# Patient Record
Sex: MALE | Employment: FULL TIME | URBAN - METROPOLITAN AREA
[De-identification: names, ages, dates, MRNs, and addresses within clinical notes are randomized per-mention and may not be internally consistent; named-entity substitution may affect disease eponyms.]

---

## 2018-04-24 ENCOUNTER — HOSPITAL ENCOUNTER (OUTPATIENT)
Dept: LAB | Facility: MEDICAL CENTER | Age: 28
End: 2018-04-24
Attending: FAMILY MEDICINE
Payer: OTHER GOVERNMENT

## 2018-04-24 ENCOUNTER — OFFICE VISIT (OUTPATIENT)
Dept: MEDICAL GROUP | Facility: PHYSICIAN GROUP | Age: 28
End: 2018-04-24
Payer: OTHER GOVERNMENT

## 2018-04-24 VITALS
HEIGHT: 68 IN | SYSTOLIC BLOOD PRESSURE: 104 MMHG | OXYGEN SATURATION: 97 % | WEIGHT: 185 LBS | TEMPERATURE: 97.7 F | RESPIRATION RATE: 14 BRPM | DIASTOLIC BLOOD PRESSURE: 82 MMHG | HEART RATE: 58 BPM | BODY MASS INDEX: 28.04 KG/M2

## 2018-04-24 DIAGNOSIS — Z20.2 STD EXPOSURE: ICD-10-CM

## 2018-04-24 PROCEDURE — 87491 CHLMYD TRACH DNA AMP PROBE: CPT

## 2018-04-24 PROCEDURE — 87591 N.GONORRHOEAE DNA AMP PROB: CPT

## 2018-04-24 PROCEDURE — 99202 OFFICE O/P NEW SF 15 MIN: CPT | Performed by: FAMILY MEDICINE

## 2018-04-24 ASSESSMENT — PATIENT HEALTH QUESTIONNAIRE - PHQ9: CLINICAL INTERPRETATION OF PHQ2 SCORE: 0

## 2018-04-24 ASSESSMENT — ENCOUNTER SYMPTOMS
NECK PAIN: 0
PSYCHIATRIC NEGATIVE: 1
RESPIRATORY NEGATIVE: 1
NEUROLOGICAL NEGATIVE: 1
EYES NEGATIVE: 1
FEVER: 0
MUSCULOSKELETAL NEGATIVE: 1
HEMOPTYSIS: 0
CONSTIPATION: 0
CHILLS: 0
COUGH: 0
PALPITATIONS: 0
GASTROINTESTINAL NEGATIVE: 1
DIZZINESS: 0
CARDIOVASCULAR NEGATIVE: 1
MYALGIAS: 0
CONSTITUTIONAL NEGATIVE: 1
HEADACHES: 0

## 2018-04-24 NOTE — PROGRESS NOTES
Subjective:      Herman Mckoy is a 27 y.o. male who presents with Sexually Transmitted Diseases            New patient visit  His gf who he has been monogamous with for several years was + for chlamydia at at gyn check up and he wants to get tested  He is without sx at all    1. STD exposure    - CHLAMYDIA/GC PCR URINE OR SWAB; Future    History reviewed. No pertinent past medical history.  History reviewed. No pertinent surgical history.  Smoking status: Light Tobacco Smoker                                                       Packs/day: 0.00      Years: 0.00      Smokeless tobacco: Current User                    Comment: occ  Alcohol use: Yes             History reviewed.  No pertinent family history.      No current outpatient prescriptions on file.    Patient was instructed on the use of medications, either prescriptions or OTC and informed on when the appropriate follow up time period should be. In addition, patient was also instructed that should any acute worsening occur that they should notify this clinic asap or call 911.            Review of Systems   Constitutional: Negative.  Negative for chills and fever.        History reviewed. No pertinent past medical history.  History reviewed. No pertinent surgical history.  Smoking status: Light Tobacco Smoker                                                       Packs/day: 0.00      Years: 0.00      Smokeless tobacco: Current User                    Comment: occ  Alcohol use: Yes             History reviewed.  No pertinent family history.     HENT: Negative.    Eyes: Negative.    Respiratory: Negative.  Negative for cough and hemoptysis.    Cardiovascular: Negative.  Negative for chest pain and palpitations.   Gastrointestinal: Negative.  Negative for constipation.   Genitourinary: Negative.  Negative for dysuria and urgency.   Musculoskeletal: Negative.  Negative for myalgias and neck pain.   Skin: Negative.  Negative for rash.   Neurological: Negative.   "Negative for dizziness and headaches.   Endo/Heme/Allergies: Negative.    Psychiatric/Behavioral: Negative.  Negative for suicidal ideas.          Objective:     /82   Pulse (!) 58   Temp 36.5 °C (97.7 °F)   Resp 14   Ht 1.727 m (5' 8\")   Wt 83.9 kg (185 lb)   SpO2 97%   BMI 28.13 kg/m²      Physical Exam   Constitutional: He is oriented to person, place, and time. He appears well-developed and well-nourished. No distress.   HENT:   Head: Normocephalic and atraumatic.   Right Ear: External ear normal.   Left Ear: External ear normal.   Nose: Nose normal.   Mouth/Throat: Oropharynx is clear and moist. No oropharyngeal exudate.   Eyes: Pupils are equal, round, and reactive to light. Right eye exhibits no discharge. Left eye exhibits no discharge. No scleral icterus.   Neck: Normal range of motion. Neck supple. No JVD present. No tracheal deviation present. No thyromegaly present.   Cardiovascular: Normal rate, regular rhythm, normal heart sounds and intact distal pulses.  Exam reveals no gallop and no friction rub.    No murmur heard.  Pulmonary/Chest: Effort normal and breath sounds normal. No stridor. No respiratory distress. He has no wheezes. He has no rales. He exhibits no tenderness.   Abdominal: Soft. He exhibits no distension. There is no tenderness.   Genitourinary: Testes normal. Uncircumcised. No phimosis, paraphimosis, hypospadias, penile erythema or penile tenderness. No discharge found.   Lymphadenopathy:     He has no cervical adenopathy.   Neurological: He is alert and oriented to person, place, and time. No cranial nerve deficit.   Skin: He is not diaphoretic.   Psychiatric: He has a normal mood and affect. His behavior is normal. Judgment and thought content normal.   Nursing note and vitals reviewed.              Assessment/Plan:     1. STD exposure  Will let him know results and treat if +  - CHLAMYDIA/GC PCR URINE OR SWAB; Future      "

## 2018-04-25 LAB
C TRACH DNA SPEC QL NAA+PROBE: POSITIVE
N GONORRHOEA DNA SPEC QL NAA+PROBE: NEGATIVE
SPECIMEN SOURCE: ABNORMAL

## 2018-04-26 ENCOUNTER — TELEPHONE (OUTPATIENT)
Dept: MEDICAL GROUP | Facility: PHYSICIAN GROUP | Age: 28
End: 2018-04-26

## 2018-04-26 DIAGNOSIS — A74.9 CHLAMYDIA: ICD-10-CM

## 2018-04-26 RX ORDER — AZITHROMYCIN 500 MG/1
1000 TABLET, FILM COATED ORAL ONCE
Qty: 2 TAB | Refills: 0 | Status: SHIPPED | OUTPATIENT
Start: 2018-04-26 | End: 2018-04-26

## 2018-04-26 NOTE — TELEPHONE ENCOUNTER
Please call patient to notify of result. I have sent a prescription for him to take to pharmacy. I have also put in a test for him to complete in 30 days for treatment cure. Thank you.   BRIA Negrete

## 2018-09-18 ENCOUNTER — OFFICE VISIT (OUTPATIENT)
Dept: MEDICAL GROUP | Facility: PHYSICIAN GROUP | Age: 28
End: 2018-09-18
Payer: OTHER GOVERNMENT

## 2018-09-18 VITALS
TEMPERATURE: 97.6 F | BODY MASS INDEX: 27.28 KG/M2 | OXYGEN SATURATION: 93 % | RESPIRATION RATE: 14 BRPM | HEART RATE: 60 BPM | HEIGHT: 68 IN | SYSTOLIC BLOOD PRESSURE: 124 MMHG | WEIGHT: 180 LBS | DIASTOLIC BLOOD PRESSURE: 60 MMHG

## 2018-09-18 DIAGNOSIS — Z91.09 ENVIRONMENTAL ALLERGIES: ICD-10-CM

## 2018-09-18 DIAGNOSIS — J30.89 NON-SEASONAL ALLERGIC RHINITIS, UNSPECIFIED TRIGGER: ICD-10-CM

## 2018-09-18 PROCEDURE — 99213 OFFICE O/P EST LOW 20 MIN: CPT | Performed by: FAMILY MEDICINE

## 2018-09-18 ASSESSMENT — ENCOUNTER SYMPTOMS
CONSTITUTIONAL NEGATIVE: 1
DEPRESSION: 0
CARDIOVASCULAR NEGATIVE: 1
CHILLS: 0
MUSCULOSKELETAL NEGATIVE: 1
FEVER: 0
PALPITATIONS: 0
HEARTBURN: 0
NEUROLOGICAL NEGATIVE: 1
GASTROINTESTINAL NEGATIVE: 1
BLURRED VISION: 0
COUGH: 0
TINGLING: 0
RESPIRATORY NEGATIVE: 1
EYES NEGATIVE: 1
DIZZINESS: 0
BRUISES/BLEEDS EASILY: 0
NAUSEA: 0
MYALGIAS: 0
PSYCHIATRIC NEGATIVE: 1
HEADACHES: 0
DOUBLE VISION: 0
HEMOPTYSIS: 0

## 2018-09-18 NOTE — PROGRESS NOTES
Subjective:      Herman Boyle is a 28 y.o. male who presents with Sinusitis            Patient with stuffy nose and watery eyes when he is at home but not at work or on vacation  He thinks he may be allergic to his dogs, not really much better with limiting his exposure at home would like to see allergist and see if by testing he is allergic to dogs or something else    1. Environmental allergies    - REFERRAL TO ALLERGY    2. Non-seasonal allergic rhinitis, unspecified trigger    - REFERRAL TO ALLERGY    History reviewed. No pertinent past medical history.  History reviewed. No pertinent surgical history.  Smoking status: Light Tobacco Smoker                                                       Packs/day: 0.00      Years: 0.00      Smokeless tobacco: Current User                    Comment: occ  Alcohol use: Yes             History reviewed.  No pertinent family history.      No current outpatient prescriptions on file.    Patient was instructed on the use of medications, either prescriptions or OTC and informed on when the appropriate follow up time period should be. In addition, patient was also instructed that should any acute worsening occur that they should notify this clinic asap or call 911.            Review of Systems   Constitutional: Negative.  Negative for chills and fever.   HENT: Positive for congestion. Negative for hearing loss.    Eyes: Negative.  Negative for blurred vision and double vision.   Respiratory: Negative.  Negative for cough and hemoptysis.    Cardiovascular: Negative.  Negative for chest pain and palpitations.   Gastrointestinal: Negative.  Negative for heartburn and nausea.   Genitourinary: Negative.  Negative for dysuria.   Musculoskeletal: Negative.  Negative for myalgias.   Skin: Negative.  Negative for rash.   Neurological: Negative.  Negative for dizziness, tingling and headaches.   Endo/Heme/Allergies: Negative.  Does not bruise/bleed easily.   Psychiatric/Behavioral:  "Negative.  Negative for depression and suicidal ideas.   All other systems reviewed and are negative.         Objective:     /60   Pulse 60   Temp 36.4 °C (97.6 °F)   Resp 14   Ht 1.727 m (5' 8\")   Wt 81.6 kg (180 lb)   SpO2 93%   BMI 27.37 kg/m²      Physical Exam   Constitutional: He is oriented to person, place, and time. He appears well-developed and well-nourished. No distress.   HENT:   Head: Normocephalic and atraumatic.   Nose: Mucosal edema and rhinorrhea present.   Mouth/Throat: Oropharynx is clear and moist. No oropharyngeal exudate.   Eyes: Pupils are equal, round, and reactive to light.   Cardiovascular: Normal rate, regular rhythm, normal heart sounds and intact distal pulses.  Exam reveals no gallop and no friction rub.    No murmur heard.  Pulmonary/Chest: Effort normal and breath sounds normal. No respiratory distress. He has no wheezes. He has no rales. He exhibits no tenderness.   Neurological: He is alert and oriented to person, place, and time.   Skin: He is not diaphoretic.   Psychiatric: He has a normal mood and affect. His behavior is normal. Judgment and thought content normal.   Nursing note and vitals reviewed.              Assessment/Plan:     1. Environmental allergies    - REFERRAL TO ALLERGY    2. Non-seasonal allergic rhinitis, unspecified trigger    - REFERRAL TO ALLERGY      "

## 2019-02-21 ENCOUNTER — TELEPHONE (OUTPATIENT)
Dept: MEDICAL GROUP | Facility: PHYSICIAN GROUP | Age: 29
End: 2019-02-21

## 2019-02-21 DIAGNOSIS — Z13.0 SCREENING FOR DEFICIENCY ANEMIA: ICD-10-CM

## 2019-02-21 DIAGNOSIS — Z13.29 SCREENING FOR THYROID DISORDER: ICD-10-CM

## 2019-02-21 DIAGNOSIS — Z13.220 SCREENING FOR LIPID DISORDERS: ICD-10-CM

## 2019-02-21 DIAGNOSIS — Z13.1 SCREENING FOR DIABETES MELLITUS: ICD-10-CM

## 2019-02-21 NOTE — TELEPHONE ENCOUNTER
Patient called and left a message regarding wanting annual  labs before his appointment. Could these be ordered before he comes in on 3/1/19?

## 2019-03-01 ENCOUNTER — OFFICE VISIT (OUTPATIENT)
Dept: MEDICAL GROUP | Facility: PHYSICIAN GROUP | Age: 29
End: 2019-03-01
Payer: OTHER GOVERNMENT

## 2019-03-01 ENCOUNTER — HOSPITAL ENCOUNTER (OUTPATIENT)
Dept: LAB | Facility: MEDICAL CENTER | Age: 29
End: 2019-03-01
Attending: INTERNAL MEDICINE
Payer: OTHER GOVERNMENT

## 2019-03-01 VITALS
RESPIRATION RATE: 14 BRPM | HEART RATE: 50 BPM | OXYGEN SATURATION: 97 % | TEMPERATURE: 97.9 F | BODY MASS INDEX: 27.23 KG/M2 | HEIGHT: 68 IN | SYSTOLIC BLOOD PRESSURE: 96 MMHG | DIASTOLIC BLOOD PRESSURE: 60 MMHG | WEIGHT: 179.7 LBS

## 2019-03-01 DIAGNOSIS — Z13.29 SCREENING FOR THYROID DISORDER: ICD-10-CM

## 2019-03-01 DIAGNOSIS — Z11.3 SCREEN FOR STD (SEXUALLY TRANSMITTED DISEASE): ICD-10-CM

## 2019-03-01 DIAGNOSIS — G89.29 CHRONIC PAIN OF RIGHT KNEE: ICD-10-CM

## 2019-03-01 DIAGNOSIS — Z11.4 SCREENING FOR HIV (HUMAN IMMUNODEFICIENCY VIRUS): ICD-10-CM

## 2019-03-01 DIAGNOSIS — Z13.0 SCREENING FOR DEFICIENCY ANEMIA: ICD-10-CM

## 2019-03-01 DIAGNOSIS — Z13.1 DIABETES MELLITUS SCREENING: ICD-10-CM

## 2019-03-01 DIAGNOSIS — E78.5 DYSLIPIDEMIA: ICD-10-CM

## 2019-03-01 DIAGNOSIS — M25.561 CHRONIC PAIN OF RIGHT KNEE: ICD-10-CM

## 2019-03-01 LAB
ALBUMIN SERPL BCP-MCNC: 4.6 G/DL (ref 3.2–4.9)
ALBUMIN/GLOB SERPL: 1.6 G/DL
ALP SERPL-CCNC: 64 U/L (ref 30–99)
ALT SERPL-CCNC: 28 U/L (ref 2–50)
ANION GAP SERPL CALC-SCNC: 7 MMOL/L (ref 0–11.9)
AST SERPL-CCNC: 46 U/L (ref 12–45)
BASOPHILS # BLD AUTO: 1.7 % (ref 0–1.8)
BASOPHILS # BLD: 0.06 K/UL (ref 0–0.12)
BILIRUB SERPL-MCNC: 0.6 MG/DL (ref 0.1–1.5)
BUN SERPL-MCNC: 17 MG/DL (ref 8–22)
CALCIUM SERPL-MCNC: 9.7 MG/DL (ref 8.5–10.5)
CHLORIDE SERPL-SCNC: 103 MMOL/L (ref 96–112)
CHOLEST SERPL-MCNC: 196 MG/DL (ref 100–199)
CO2 SERPL-SCNC: 28 MMOL/L (ref 20–33)
CREAT SERPL-MCNC: 1 MG/DL (ref 0.5–1.4)
EOSINOPHIL # BLD AUTO: 0.21 K/UL (ref 0–0.51)
EOSINOPHIL NFR BLD: 6.1 % (ref 0–6.9)
ERYTHROCYTE [DISTWIDTH] IN BLOOD BY AUTOMATED COUNT: 43.4 FL (ref 35.9–50)
FASTING STATUS PATIENT QL REPORTED: NORMAL
GLOBULIN SER CALC-MCNC: 2.8 G/DL (ref 1.9–3.5)
GLUCOSE SERPL-MCNC: 87 MG/DL (ref 65–99)
HBV SURFACE AG SER QL: NEGATIVE
HCT VFR BLD AUTO: 46.7 % (ref 42–52)
HCV AB SER QL: NEGATIVE
HDLC SERPL-MCNC: 49 MG/DL
HGB BLD-MCNC: 15.4 G/DL (ref 14–18)
HIV 1+2 AB+HIV1 P24 AG SERPL QL IA: NON REACTIVE
IMM GRANULOCYTES # BLD AUTO: 0.01 K/UL (ref 0–0.11)
IMM GRANULOCYTES NFR BLD AUTO: 0.3 % (ref 0–0.9)
LDLC SERPL CALC-MCNC: 136 MG/DL
LYMPHOCYTES # BLD AUTO: 1.46 K/UL (ref 1–4.8)
LYMPHOCYTES NFR BLD: 42.4 % (ref 22–41)
MCH RBC QN AUTO: 30.3 PG (ref 27–33)
MCHC RBC AUTO-ENTMCNC: 33 G/DL (ref 33.7–35.3)
MCV RBC AUTO: 91.7 FL (ref 81.4–97.8)
MONOCYTES # BLD AUTO: 0.39 K/UL (ref 0–0.85)
MONOCYTES NFR BLD AUTO: 11.3 % (ref 0–13.4)
NEUTROPHILS # BLD AUTO: 1.31 K/UL (ref 1.82–7.42)
NEUTROPHILS NFR BLD: 38.2 % (ref 44–72)
NRBC # BLD AUTO: 0 K/UL
NRBC BLD-RTO: 0 /100 WBC
PLATELET # BLD AUTO: 215 K/UL (ref 164–446)
PMV BLD AUTO: 11.3 FL (ref 9–12.9)
POTASSIUM SERPL-SCNC: 4.3 MMOL/L (ref 3.6–5.5)
PROT SERPL-MCNC: 7.4 G/DL (ref 6–8.2)
RBC # BLD AUTO: 5.09 M/UL (ref 4.7–6.1)
SODIUM SERPL-SCNC: 138 MMOL/L (ref 135–145)
TREPONEMA PALLIDUM IGG+IGM AB [PRESENCE] IN SERUM OR PLASMA BY IMMUNOASSAY: NON REACTIVE
TRIGL SERPL-MCNC: 54 MG/DL (ref 0–149)
TSH SERPL DL<=0.005 MIU/L-ACNC: 1.13 UIU/ML (ref 0.38–5.33)
WBC # BLD AUTO: 3.4 K/UL (ref 4.8–10.8)

## 2019-03-01 PROCEDURE — 36415 COLL VENOUS BLD VENIPUNCTURE: CPT

## 2019-03-01 PROCEDURE — 80061 LIPID PANEL: CPT

## 2019-03-01 PROCEDURE — 87340 HEPATITIS B SURFACE AG IA: CPT

## 2019-03-01 PROCEDURE — 99395 PREV VISIT EST AGE 18-39: CPT | Performed by: INTERNAL MEDICINE

## 2019-03-01 PROCEDURE — 86803 HEPATITIS C AB TEST: CPT

## 2019-03-01 PROCEDURE — 80053 COMPREHEN METABOLIC PANEL: CPT

## 2019-03-01 PROCEDURE — 86780 TREPONEMA PALLIDUM: CPT

## 2019-03-01 PROCEDURE — 87591 N.GONORRHOEAE DNA AMP PROB: CPT

## 2019-03-01 PROCEDURE — 84443 ASSAY THYROID STIM HORMONE: CPT

## 2019-03-01 PROCEDURE — 87491 CHLMYD TRACH DNA AMP PROBE: CPT

## 2019-03-01 PROCEDURE — 87389 HIV-1 AG W/HIV-1&-2 AB AG IA: CPT

## 2019-03-01 PROCEDURE — 85025 COMPLETE CBC W/AUTO DIFF WBC: CPT

## 2019-03-01 RX ORDER — CETIRIZINE HYDROCHLORIDE 10 MG/1
10 TABLET ORAL DAILY
Qty: 30 TAB | COMMUNITY
Start: 2019-03-01 | End: 2019-12-10

## 2019-03-01 ASSESSMENT — PATIENT HEALTH QUESTIONNAIRE - PHQ9: CLINICAL INTERPRETATION OF PHQ2 SCORE: 0

## 2019-03-01 NOTE — PROGRESS NOTES
CC: well exam    Subjective:     Herman Boyle is a 28 y.o. male presents for a routine preventive health exam.    Right knee pain  He runs 3 miles three times a week. Carries 50 lb of gears 4 times. He would like to know how to prevent injuries in the future. Sometimes swells too better with rest.     Dyslipidemia  Was told that his cholesterol is high. Came in to double check. Denies family hx of dyslipidemia, cardiac disease.      Health Maintenance    Diabetes Screening: ordered    Diet: He eats a lot of red meat. education provided.   Exercise: running  Substance Abuse: none.   Seat belts, bike helmet, gun safety discussed.  Sun protection used.      Review of systems: right knee pain.  Denies any weight loss, fevers, fatigue, vision changes, sore throat, swollen glands, rashes, shortness of breath, chest pain, numbness, nausea, vomiting, diarrhea, constipation, abdominal pain, dysuria, hematuria, joint pain, muscle weakness, depression.All systems were reviewed and are negative.    Allergies, past medical history, past surgical history, medications, family history, social history reviewed and updated.    No current outpatient prescriptions on file.    No Known Allergies    Past Medical History:   Diagnosis Date   • Dyslipidemia 3/1/2019     History reviewed. No pertinent surgical history.  History reviewed. No pertinent family history.  Social History     Social History   • Marital status: Single     Spouse name: N/A   • Number of children: N/A   • Years of education: N/A     Occupational History   • Not on file.     Social History Main Topics   • Smoking status: Never Smoker   • Smokeless tobacco: Current User      Comment: occ   • Alcohol use Yes   • Drug use: No   • Sexual activity: Yes     Partners: Female     Other Topics Concern   • Not on file     Social History Narrative   • No narrative on file       Objective:     Vitals: BP (!) 96/60 (BP Location: Left arm, Patient Position: Sitting, BP Cuff Size:  "Adult)   Pulse (!) 50   Temp 36.6 °C (97.9 °F) (Temporal)   Resp 14   Ht 1.727 m (5' 8\")   Wt 81.5 kg (179 lb 11.2 oz)   SpO2 97%   BMI 27.32 kg/m²   General: Alert, pleasant, NAD  HEENT:  Normocephalic.  PERRL, EOMI, no icterus or pallor.  Conjunctivae and lids normal.  External ears normal. Tympanic membranes pearly, opaque.  Nares patent, mucosa pink.  Oropharynx non-erythematous, mucous membranes moist.  Neck supple.  No thyromegaly or masses palpated. No cervical or supraclavicular lymphadenopathy.  Heart:  Regular rate and rhythm.  S1 and S2 normal.  No murmurs appreciated.  Respiratory:  Normal respiratory effort.  Clear to auscultation bilaterally.  Abdomen:  Non-distended, soft, non-tender, no guarding/rebound. No hepatosplenomegaly.  No hernias.  Skin:  Warm, dry, no rashes  Musculoskeletal:  Gait is normal.  Moves all extremities well.  Extremities:  No leg edema.  Neurological: No tremors, sensation grossly intact, patellar and biceps reflexes 2+ symmetric, tone/strength normal  Psych:  Affect/mood is normal, judgement is good, memory is intact, grooming is appropriate.    Knee: Negative anterior drawer, posterior drawer test bilaterally.  Positive Trudi on the right knee.  Negative Trudi on the left knee.  Motor 5 out of 5, sensation intact.  \    Assessment/Plan:     Herman was seen today for annual exam.    Diagnoses and all orders for this visit:    Dyslipidemia  -     Lipid Profile; Future    Screen for STD (sexually transmitted disease)  -     Chlamydia/GC PCR Urine Or Swab; Future  -     HEP B SURFACE ANTIGEN; Future  -     HIV AG/AB COMBO ASSAY SCREENING; Future  -     HEP C VIRUS ANTIBODY; Future  -     T.PALLIDUM AB EIA; Future    Screening for HIV (human immunodeficiency virus)  -     HIV AG/AB COMBO ASSAY SCREENING; Future    Screening for thyroid disorder  -     TSH WITH REFLEX TO FT4; Future    Screening for deficiency anemia  -     CBC WITH DIFFERENTIAL; Future    Diabetes " mellitus screening  -     Comp Metabolic Panel; Future    Chronic pain of right knee  -     REFERRAL TO SPORTS MEDICINE    Return if symptoms worsen or fail to improve.

## 2019-03-01 NOTE — ASSESSMENT & PLAN NOTE
He runs 3 miles three times a week. Carries 50 lb of gears 4 times. He would like to know how to prevent injuries in the future. Sometimes swells too better with rest.

## 2019-03-01 NOTE — ASSESSMENT & PLAN NOTE
Was told that his cholesterol is high. Came in to double check. Denies family hx of dyslipidemia, cardiac disease.

## 2019-03-02 LAB
C TRACH DNA SPEC QL NAA+PROBE: NEGATIVE
N GONORRHOEA DNA SPEC QL NAA+PROBE: NEGATIVE
SPECIMEN SOURCE: NORMAL

## 2019-12-10 ENCOUNTER — OFFICE VISIT (OUTPATIENT)
Dept: MEDICAL GROUP | Facility: PHYSICIAN GROUP | Age: 29
End: 2019-12-10
Payer: OTHER GOVERNMENT

## 2019-12-10 VITALS
DIASTOLIC BLOOD PRESSURE: 60 MMHG | TEMPERATURE: 97.7 F | BODY MASS INDEX: 27.05 KG/M2 | WEIGHT: 178.5 LBS | OXYGEN SATURATION: 98 % | HEIGHT: 68 IN | RESPIRATION RATE: 14 BRPM | SYSTOLIC BLOOD PRESSURE: 100 MMHG | HEART RATE: 55 BPM

## 2019-12-10 DIAGNOSIS — Z00.00 ROUTINE GENERAL MEDICAL EXAMINATION AT HEALTH CARE FACILITY: ICD-10-CM

## 2019-12-10 DIAGNOSIS — T78.40XS ALLERGIC STATE, SEQUELA: ICD-10-CM

## 2019-12-10 DIAGNOSIS — E78.5 DYSLIPIDEMIA: ICD-10-CM

## 2019-12-10 PROBLEM — T78.40XA ALLERGIES: Status: ACTIVE | Noted: 2019-12-10

## 2019-12-10 PROCEDURE — 99214 OFFICE O/P EST MOD 30 MIN: CPT | Performed by: INTERNAL MEDICINE

## 2019-12-10 NOTE — PROGRESS NOTES
"Established Patient    Herman Boyle is a 29 y.o. male who presents today with the following:    CC:   Chief Complaint   Patient presents with   • Follow-Up     PFT referral       HPI:     Dyslipidemia     Ref. Range 3/1/2019 10:55   Cholesterol,Tot Latest Ref Range: 100 - 199 mg/dL 196   Triglycerides Latest Ref Range: 0 - 149 mg/dL 54   HDL Latest Ref Range: >=40 mg/dL 49   LDL Latest Ref Range: <100 mg/dL 136 (H)   Lifestyle modifications        Allergies  Allergic to local pollens, cats, dogs, some trees,  Going through desensitization therapy.   Seeing Dr. Malave allergist in [place].   Patient used to have sinus congestion and some wheezing with exercise which resolved. He needs to prove to his work in the  that he does not have asthma.   Denies SOB, wheezing.             No current outpatient medications on file.     No current facility-administered medications for this visit.        Allergies, past medical history, past surgical history, medications, family history, social history reviewed and updated.    ROS   Constitutional: Denies fevers or chills  Eyes: Denies changes in vision  Ears/Nose/Throat/Mouth: Denies nasal congestion or sore throat   Cardiovascular: Denies chest pain or palpitations   Respiratory: Denies shortness of breath , Denies cough  Gastrointestinal/Hepatic: Denies abd pain, nausea, vomiting   Genitourinary: Denies dysuria or frequency  Musculoskeletal/Rheum: Denies joint pain and swelling   Neurological: Denies headache  Psychiatric: Denies mood disorder   Endocrine: Denies hx of diabetes or thyroid dysfunction  Heme/Oncology/Lymph Nodes: Denies weight changes or enlarged LNs.    Physical Exam  Vitals: /60 (BP Location: Right arm, Patient Position: Sitting, BP Cuff Size: Adult)   Pulse (!) 55   Temp 36.5 °C (97.7 °F) (Temporal)   Resp 14   Ht 1.727 m (5' 8\")   Wt 81 kg (178 lb 8 oz)   SpO2 98%   BMI 27.14 kg/m²   General: Alert, pleasant, NAD  HEENT: Normocephalic.  " EOMI, no icterus or pallor.  Conjunctivae and lids normal. External ears normal. Oropharynx non-erythematous, mucous membranes moist.  Neck supple.  No thyromegaly or masses palpated.   Lymph: No cervical or supraclavicular lymphadenopathy.  Cardiovascular: Regular rate and rhythm.  S1 and S2 normal.  No murmurs appreciated.  Respiratory: Normal respiratory effort.  Clear to auscultation bilaterally.  Abdomen: Non-distended, soft  Skin: Warm, dry, no rashes.  Musculoskeletal: Gait is normal.  Moves all extremities well.  Extremities: No leg edema.  radial pulses 2+ symmetric.   Psych:  Affect/mood is normal, judgement is good, memory is intact, grooming is appropriate.      Labs (3/1/19) were reviewed and discussed with patients.  All questions were answered.      Assessment and Plan    1. Allergic state, sequela  - PULMONARY FUNCTION TESTS -Test requested: Complete Pulmonary Function Test; Include MIPS/MEPS? Yes; Future  - CBC WITH DIFFERENTIAL; Future  Follow with allergist    2. Dyslipidemia  Lifestyle modifications  - Lipid Profile; Future    3. Routine general medical examination at health care facility  - Comp Metabolic Panel; Future  - TSH WITH REFLEX TO FT4; Future        Follow-up:Return if symptoms worsen or fail to improve.    This note was created using voice recognition software. There may be unintended errors in spelling, grammar or content.

## 2019-12-10 NOTE — PATIENT INSTRUCTIONS
[facility]-> health topics-> cholesterol  Find out TD or TDAP and when?    call [phone] to schedule pulmonary function test

## 2019-12-10 NOTE — ASSESSMENT & PLAN NOTE
Allergic to local pollens, cats, dogs, some trees,  Going through desensitization therapy.   Seeing Dr. Malave allergist in [place].   Patient used to have sinus congestion and some wheezing with exercise which resolved. He needs to prove to his work in the  that he does not have asthma.   Denies SOB, wheezing.

## 2019-12-10 NOTE — ASSESSMENT & PLAN NOTE
Ref. Range 3/1/2019 10:55   Cholesterol,Tot Latest Ref Range: 100 - 199 mg/dL 196   Triglycerides Latest Ref Range: 0 - 149 mg/dL 54   HDL Latest Ref Range: >=40 mg/dL 49   LDL Latest Ref Range: <100 mg/dL 136 (H)   Lifestyle modifications

## 2021-03-29 ENCOUNTER — HOSPITAL ENCOUNTER (OUTPATIENT)
Dept: LAB | Facility: MEDICAL CENTER | Age: 31
End: 2021-03-29
Attending: NURSE PRACTITIONER
Payer: OTHER GOVERNMENT

## 2021-03-29 ENCOUNTER — OFFICE VISIT (OUTPATIENT)
Dept: MEDICAL GROUP | Facility: PHYSICIAN GROUP | Age: 31
End: 2021-03-29
Payer: OTHER GOVERNMENT

## 2021-03-29 VITALS
BODY MASS INDEX: 24.92 KG/M2 | RESPIRATION RATE: 12 BRPM | OXYGEN SATURATION: 98 % | HEIGHT: 68 IN | TEMPERATURE: 97.2 F | HEART RATE: 65 BPM | DIASTOLIC BLOOD PRESSURE: 72 MMHG | WEIGHT: 164.4 LBS | SYSTOLIC BLOOD PRESSURE: 120 MMHG

## 2021-03-29 DIAGNOSIS — T78.40XS ALLERGY, SEQUELA: ICD-10-CM

## 2021-03-29 DIAGNOSIS — G89.29 CHRONIC PAIN OF RIGHT KNEE: ICD-10-CM

## 2021-03-29 DIAGNOSIS — E78.5 DYSLIPIDEMIA: ICD-10-CM

## 2021-03-29 DIAGNOSIS — M25.561 CHRONIC PAIN OF RIGHT KNEE: ICD-10-CM

## 2021-03-29 PROCEDURE — 36415 COLL VENOUS BLD VENIPUNCTURE: CPT

## 2021-03-29 PROCEDURE — 80061 LIPID PANEL: CPT

## 2021-03-29 PROCEDURE — 85025 COMPLETE CBC W/AUTO DIFF WBC: CPT

## 2021-03-29 PROCEDURE — 99213 OFFICE O/P EST LOW 20 MIN: CPT | Performed by: NURSE PRACTITIONER

## 2021-03-29 PROCEDURE — 80053 COMPREHEN METABOLIC PANEL: CPT

## 2021-03-29 ASSESSMENT — ENCOUNTER SYMPTOMS
FEVER: 0
NEUROLOGICAL NEGATIVE: 1
CHILLS: 0
RESPIRATORY NEGATIVE: 1
NECK PAIN: 0
CARDIOVASCULAR NEGATIVE: 1
BACK PAIN: 0
SHORTNESS OF BREATH: 0
GASTROINTESTINAL NEGATIVE: 1
PSYCHIATRIC NEGATIVE: 1
EYES NEGATIVE: 1
WHEEZING: 0
WEIGHT LOSS: 0
PALPITATIONS: 0

## 2021-03-29 ASSESSMENT — PATIENT HEALTH QUESTIONNAIRE - PHQ9: CLINICAL INTERPRETATION OF PHQ2 SCORE: 0

## 2021-03-29 NOTE — ASSESSMENT & PLAN NOTE
3/1/2019 showed an elevated LDL of 136.    He worked on lifestyle modifications, diet, exercise.  He is already been fasting will go get blood work today.

## 2021-03-29 NOTE — ASSESSMENT & PLAN NOTE
Chronic stable condition.    Last time he saw Dr. Fields he was given a sports medicine referral however due to Covid he is unable to go.    He states that running does cause bilateral knee pain however its most notable to his right knee.    He can feel the pain after running roughly 2 miles and the pain will come on toes lateral side of his right knee.  Occasionally when he bends that he can feel it click.  He denies ever feeling unstable.  He does not take anything over-the-counter nor does he use ice when it does become painful.    He works as a  and used to carry 50 pound plate on his chest however he stopped doing that due to the pain in his knees.   Denies any issues with walking.

## 2021-03-29 NOTE — ASSESSMENT & PLAN NOTE
Chronic and stable condition.    States pretty extensive allergies to local pollens, cats, dogs and trees.    He is currently going through sensation therapy with Dr. Malave allergist in [place].    Gets protein injections for the allergens monthly.  Was last seen February 2021 by Dr. Malave.  Denies any current congestion or wheezing.

## 2021-03-29 NOTE — PATIENT INSTRUCTIONS
1. Get labs completed prior to our next visit.  These will be fasting labs, do not eat or drink 8 to 10 hours prior to your labs.  Make sure that you drink lots of water.  We will discuss the results of these at our next appointment. [phone]    2.  Referral sent to Sports Medicine for knee pain.  If you do not hear from them in the next 3-5 business days please reach out to me through [employer]. [phone]    3. Find out when last tetanus shot was

## 2021-03-29 NOTE — PROGRESS NOTES
Chief Complaint   Patient presents with   • Establish Care   • Referral Needed      Subjective:     Herman Boyle is a 30 y.o. male presenting to establish care and management of:      Allergies  Chronic and stable condition.    States pretty extensive allergies to local pollens, cats, dogs and trees.    He is currently going through sensation therapy with Dr. Malave allergist in [place].    Gets protein injections for the allergens monthly.  Was last seen February 2021 by Dr. Malave.  Denies any current congestion or wheezing.      Dyslipidemia  3/1/2019 showed an elevated LDL of 136.    He worked on lifestyle modifications, diet, exercise.  He is already been fasting will go get blood work today.        Right knee pain  Chronic stable condition.    Last time he saw Dr. Fields he was given a sports medicine referral however due to Covid he is unable to go.    He states that running does cause bilateral knee pain however its most notable to his right knee.    He can feel the pain after running roughly 2 miles and the pain will come on toes lateral side of his right knee.  Occasionally when he bends that he can feel it click.  He denies ever feeling unstable.  He does not take anything over-the-counter nor does he use ice when it does become painful.    He works as a  and used to carry 50 pound plate on his chest however he stopped doing that due to the pain in his knees.   Denies any issues with walking.       Review of Systems   Constitutional: Negative for chills, fever and weight loss.   HENT: Negative.    Eyes: Negative.    Respiratory: Negative.  Negative for shortness of breath and wheezing.    Cardiovascular: Negative.  Negative for chest pain, palpitations and leg swelling.        States at one point his watch showed his heart rate was less than 40 for 1 time for 10 minutes.  He denied at that time any lightheadedness or difficulty breathing.   Gastrointestinal: Negative.    Genitourinary: Negative.   "  Musculoskeletal: Positive for joint pain. Negative for back pain and neck pain.        Right knee mostly but bilateral knees   Skin: Negative.    Neurological: Negative.    Psychiatric/Behavioral: Negative.           No current outpatient medications on file.    Past Medical History:   Diagnosis Date   • Dyslipidemia 3/1/2019       History reviewed. No pertinent surgical history.    Family History   Problem Relation Age of Onset   • Diabetes Mother    • Alcohol abuse Father        Patient has no known allergies.    Allergies, past medical history, past surgical history, family history, social history reviewed and updated    Objective:     Vitals: /72 (BP Location: Left arm, Patient Position: Sitting, BP Cuff Size: Adult)   Pulse 65   Temp 36.2 °C (97.2 °F) (Temporal)   Resp 12   Ht 1.727 m (5' 8\")   Wt 74.6 kg (164 lb 6.4 oz)   SpO2 98%   BMI 25.00 kg/m²   General: Alert, pleasant, NAD  EYES:   PERRL, EOMI, no icterus or pallor.  Conjunctivae and lids normal.   HENT:  Normocephalic.  External ears normal. Tympanic membranes pearly, opaque.  No nasal drainage present.  Oropharynx non-erythematous, mucous membranes moist.  Neck supple.   No thyromegaly or masses palpated. No cervical or supraclavicular lymphadenopathy.  Heart: Regular rate and rhythm.  S1 and S2 normal.  No murmurs appreciated.  Respiratory: Normal respiratory effort.  Clear to auscultation bilaterally.  Abdomen: Non-distended, soft, non-tender, no guarding/rebound. Bowel sounds present.  No hepatosplenomegaly.  No hernias.  Skin: Warm, dry, no rashes.  Musculoskeletal: Gait is normal.  Moves all extremities well.    Extremities: No clubbing, cyanosis or edema noted.   Neurological: No tremors, sensation grossly intact, tone/strength normal, gait is normal, CN2-12 intact.  Psych:  Affect/mood is normal, judgement is good, memory is intact, grooming is appropriate.    Assessment/Plan:     1. Chronic pain of right knee  - REFERRAL TO " SPORTS MEDICINE  Discussion regarding over-the-counter NSAIDs and ice as needed.    2. Allergy, sequela  - CBC WITH DIFFERENTIAL; Future  Follow-up with Dr. Malave as needed.    3. Dyslipidemia  - Comp Metabolic Panel; Future  - Lipid Profile; Future       Discussed with patient possible alternative diagnoses.     Reviewed risks and benefits of treatment plan. Patient verbalizes understanding of all instruction and verbally agrees to plan.      Return in about 2 weeks (around 4/12/2021) for follow up lab.    My total time spent caring for the patient on the day of the encounter was 27 minutes.   This does not include time spent on separately billable procedures/tests.     Please note that this dictation was created using voice recognition software. I have made every reasonable attempt to correct obvious errors, but I expect that there are errors of grammar and possibly content that I did not discover before finalizing the note.

## 2021-03-30 LAB
ALBUMIN SERPL BCP-MCNC: 4.6 G/DL (ref 3.2–4.9)
ALBUMIN/GLOB SERPL: 1.5 G/DL
ALP SERPL-CCNC: 71 U/L (ref 30–99)
ALT SERPL-CCNC: 17 U/L (ref 2–50)
ANION GAP SERPL CALC-SCNC: 10 MMOL/L (ref 7–16)
AST SERPL-CCNC: 20 U/L (ref 12–45)
BASOPHILS # BLD AUTO: 1.5 % (ref 0–1.8)
BASOPHILS # BLD: 0.07 K/UL (ref 0–0.12)
BILIRUB SERPL-MCNC: 0.4 MG/DL (ref 0.1–1.5)
BUN SERPL-MCNC: 11 MG/DL (ref 8–22)
CALCIUM SERPL-MCNC: 9.7 MG/DL (ref 8.5–10.5)
CHLORIDE SERPL-SCNC: 101 MMOL/L (ref 96–112)
CHOLEST SERPL-MCNC: 194 MG/DL (ref 100–199)
CO2 SERPL-SCNC: 27 MMOL/L (ref 20–33)
CREAT SERPL-MCNC: 0.84 MG/DL (ref 0.5–1.4)
EOSINOPHIL # BLD AUTO: 0.18 K/UL (ref 0–0.51)
EOSINOPHIL NFR BLD: 3.9 % (ref 0–6.9)
ERYTHROCYTE [DISTWIDTH] IN BLOOD BY AUTOMATED COUNT: 42.1 FL (ref 35.9–50)
FASTING STATUS PATIENT QL REPORTED: NORMAL
GLOBULIN SER CALC-MCNC: 3 G/DL (ref 1.9–3.5)
GLUCOSE SERPL-MCNC: 99 MG/DL (ref 65–99)
HCT VFR BLD AUTO: 43.3 % (ref 42–52)
HDLC SERPL-MCNC: 46 MG/DL
HGB BLD-MCNC: 14.8 G/DL (ref 14–18)
IMM GRANULOCYTES # BLD AUTO: 0 K/UL (ref 0–0.11)
IMM GRANULOCYTES NFR BLD AUTO: 0 % (ref 0–0.9)
LDLC SERPL CALC-MCNC: 130 MG/DL
LYMPHOCYTES # BLD AUTO: 1.34 K/UL (ref 1–4.8)
LYMPHOCYTES NFR BLD: 29.3 % (ref 22–41)
MCH RBC QN AUTO: 31 PG (ref 27–33)
MCHC RBC AUTO-ENTMCNC: 34.2 G/DL (ref 33.7–35.3)
MCV RBC AUTO: 90.6 FL (ref 81.4–97.8)
MONOCYTES # BLD AUTO: 0.41 K/UL (ref 0–0.85)
MONOCYTES NFR BLD AUTO: 9 % (ref 0–13.4)
NEUTROPHILS # BLD AUTO: 2.57 K/UL (ref 1.82–7.42)
NEUTROPHILS NFR BLD: 56.3 % (ref 44–72)
NRBC # BLD AUTO: 0 K/UL
NRBC BLD-RTO: 0 /100 WBC
PLATELET # BLD AUTO: 214 K/UL (ref 164–446)
PMV BLD AUTO: 11.8 FL (ref 9–12.9)
POTASSIUM SERPL-SCNC: 4.3 MMOL/L (ref 3.6–5.5)
PROT SERPL-MCNC: 7.6 G/DL (ref 6–8.2)
RBC # BLD AUTO: 4.78 M/UL (ref 4.7–6.1)
SODIUM SERPL-SCNC: 138 MMOL/L (ref 135–145)
TRIGL SERPL-MCNC: 90 MG/DL (ref 0–149)
WBC # BLD AUTO: 4.6 K/UL (ref 4.8–10.8)

## 2021-04-14 ENCOUNTER — OFFICE VISIT (OUTPATIENT)
Dept: MEDICAL GROUP | Facility: PHYSICIAN GROUP | Age: 31
End: 2021-04-14
Payer: OTHER GOVERNMENT

## 2021-04-14 VITALS
HEART RATE: 51 BPM | SYSTOLIC BLOOD PRESSURE: 110 MMHG | BODY MASS INDEX: 25.1 KG/M2 | TEMPERATURE: 96.4 F | OXYGEN SATURATION: 99 % | RESPIRATION RATE: 12 BRPM | DIASTOLIC BLOOD PRESSURE: 70 MMHG | WEIGHT: 165.6 LBS | HEIGHT: 68 IN

## 2021-04-14 DIAGNOSIS — M25.561 CHRONIC PAIN OF RIGHT KNEE: ICD-10-CM

## 2021-04-14 DIAGNOSIS — Z00.00 ANNUAL PHYSICAL EXAM: ICD-10-CM

## 2021-04-14 DIAGNOSIS — G89.29 CHRONIC PAIN OF RIGHT KNEE: ICD-10-CM

## 2021-04-14 DIAGNOSIS — E78.2 MODERATE MIXED HYPERLIPIDEMIA NOT REQUIRING STATIN THERAPY: ICD-10-CM

## 2021-04-14 PROCEDURE — 99212 OFFICE O/P EST SF 10 MIN: CPT | Performed by: NURSE PRACTITIONER

## 2021-04-14 ASSESSMENT — ENCOUNTER SYMPTOMS
SHORTNESS OF BREATH: 0
WHEEZING: 0
PALPITATIONS: 0
FEVER: 0
COUGH: 0
DIZZINESS: 0
CHILLS: 0
WEIGHT LOSS: 0
HEADACHES: 0
WEAKNESS: 0

## 2021-04-14 ASSESSMENT — FIBROSIS 4 INDEX: FIB4 SCORE: 0.7

## 2021-04-14 NOTE — ASSESSMENT & PLAN NOTE
Chronic and stable condition.   Patient continues to be active with the , [employer], and public service, [facility].     Results for FREDERICK JAMISON (MRN [id number]) as of 4/14/2021 07:52   Ref. Range 3/29/2021 08:10   Cholesterol,Tot Latest Ref Range: 100 - 199 mg/dL 194   Triglycerides Latest Ref Range: 0 - 149 mg/dL 90   HDL Latest Ref Range: >=40 mg/dL 46   LDL Latest Ref Range: <100 mg/dL 130 (H)

## 2021-04-14 NOTE — PROGRESS NOTES
CC:  Chief Complaint   Patient presents with   • Follow-Up   • Lab Results       HISTORY OF PRESENT ILLNESS: Patient is a 30 y.o. male established patient presenting for follow-up labs and referral:      Moderate mixed hyperlipidemia not requiring statin therapy  Chronic and stable condition.   Patient continues to be active with the , [employer], and public service, [facility].     Results for FREDERICK JAMISON (MRN [id number]) as of 2021 07:52   Ref. Range 3/29/2021 08:10   Cholesterol,Tot Latest Ref Range: 100 - 199 mg/dL 194   Triglycerides Latest Ref Range: 0 - 149 mg/dL 90   HDL Latest Ref Range: >=40 mg/dL 46   LDL Latest Ref Range: <100 mg/dL 130 (H)       Right knee pain  Chronic and stable.  Has not seen Sports Medicine yet but has appointment set for  at [facility].  Not currently doing any ice, elevation, rest or compression.   Not currently utilizing any medications- OTC.  States no change since last visit.   Denies any issues with ambulation, pivoting or ROM.        Patient Active Problem List    Diagnosis Date Noted   • Allergies 12/10/2019   • Moderate mixed hyperlipidemia not requiring statin therapy 2019   • Right knee pain 2019      Allergies:Patient has no known allergies.    No current outpatient medications on file.     No current facility-administered medications for this visit.       Social History     Tobacco Use   • Smoking status: Former Smoker     Packs/day: 0.00     Types: Cigarettes     Quit date: 3/29/2017     Years since quittin.0   • Smokeless tobacco: Current User     Types: Chew   • Tobacco comment: occ   Substance Use Topics   • Alcohol use: Yes     Alcohol/week: 2.4 oz     Types: 4 Shots of liquor per week     Comment: 4 drinks a wk   • Drug use: No     Social History     Social History Narrative   • Not on file       Family History   Problem Relation Age of Onset   • Diabetes Mother    • Alcohol abuse Father         Review of Systems   Constitutional:  "Negative for chills, fever and weight loss.   Respiratory: Negative for cough, shortness of breath and wheezing.    Cardiovascular: Negative for chest pain, palpitations and leg swelling.   Musculoskeletal: Positive for joint pain.        Right knee pain- still painful- no change   Neurological: Negative for dizziness, weakness and headaches.             - NOTE: All other systems reviewed and are negative, except as in HPI.      Exam:    /70 (BP Location: Right arm, Patient Position: Sitting, BP Cuff Size: Adult)   Pulse (!) 51   Temp (!) 35.8 °C (96.4 °F) (Temporal)   Resp 12   Ht 1.727 m (5' 8\")   Wt 75.1 kg (165 lb 9.6 oz)   SpO2 99%  Body mass index is 25.18 kg/m².    General: Alert, pleasant, NAD  EYES:   PERRL, EOMI, no icterus or pallor.  Conjunctivae and lids normal.   HENT:  Normocephalic.  External ears normal. Tympanic membranes pearly, opaque.  No nasal drainage present.  Oropharynx non-erythematous, mucous membranes moist.    Heart: Bradycardic  rate and regular rhythm.  S1 and S2 normal.  No murmurs appreciated.  Respiratory: Normal respiratory effort.  Clear to auscultation bilaterally.  Abdomen: Non-distended, soft, non-tender, no guarding/rebound.   Skin: Warm, dry, no rashes.  Musculoskeletal: Gait is normal.  Moves all extremities well.    Extremities: No clubbing, cyanosis or edema noted.   Neurological: No tremors, sensation grossly intact, tone/strength normal, gait is normal, CN2-12 intact.  Psych:  Affect/mood is normal, judgement is good, memory is intact, grooming is appropriate.      LABS: 3/29/2021: Results reviewed and discussed with the patient, questions answered.    Assessment/Plan:  1. Moderate mixed hyperlipidemia not requiring statin therapy  Continue with lifestyle management, diet and exercise.   Will do follow up labs in 1 year.    2. Chronic pain of right knee  Follow up with [facility] for Sports Medicine referral    3. Annual physical exam  - CBC WITH " DIFFERENTIAL; Future  - Lipid Profile; Future  - Comp Metabolic Panel; Future  Labs placed for 1 year follow up visit    Discussed with patient possible alternative diagnoses, patient is to take all medications as prescribed.     If symptoms persist FU w/PCP, if symptoms worsen go to emergency room.     If experiencing any side effects from prescribed medications reports to the office immediately or go to emergency room.    Reviewed indication, dosage, usage and potential adverse effects of prescribed medications.     Reviewed risks and benefits of treatment plan. Patient verbalizes understanding of all instruction and verbally agrees to plan.      Return in about 1 year (around 4/14/2022) for AWV.    My total time spent caring for the patient on the day of the encounter was 19 minutes.   This does not include time spent on separately billable procedures/tests.     Please note that this dictation was created using voice recognition software. I have made every reasonable attempt to correct obvious errors, but I expect that there are errors of grammar and possibly content that I did not discover before finalizing the note.

## 2021-04-14 NOTE — ASSESSMENT & PLAN NOTE
Chronic and stable.  Has not seen Sports Medicine yet but has appointment set for 5/25 at [facility].  Not currently doing any ice, elevation, rest or compression.   Not currently utilizing any medications- OTC.  States no change since last visit.   Denies any issues with ambulation, pivoting or ROM.

## 2022-11-01 ENCOUNTER — OFFICE VISIT (OUTPATIENT)
Dept: MEDICAL GROUP | Facility: PHYSICIAN GROUP | Age: 32
End: 2022-11-01
Payer: COMMERCIAL

## 2022-11-01 VITALS
HEART RATE: 61 BPM | RESPIRATION RATE: 14 BRPM | DIASTOLIC BLOOD PRESSURE: 70 MMHG | TEMPERATURE: 98.3 F | OXYGEN SATURATION: 96 % | SYSTOLIC BLOOD PRESSURE: 120 MMHG | HEIGHT: 68 IN | BODY MASS INDEX: 28.19 KG/M2 | WEIGHT: 186 LBS

## 2022-11-01 DIAGNOSIS — Z23 NEED FOR VACCINATION: ICD-10-CM

## 2022-11-01 DIAGNOSIS — E78.2 MODERATE MIXED HYPERLIPIDEMIA NOT REQUIRING STATIN THERAPY: ICD-10-CM

## 2022-11-01 DIAGNOSIS — Z00.00 PHYSICAL EXAM, ANNUAL: ICD-10-CM

## 2022-11-01 DIAGNOSIS — Z11.3 SCREENING FOR STD (SEXUALLY TRANSMITTED DISEASE): ICD-10-CM

## 2022-11-01 DIAGNOSIS — Z11.4 SCREENING FOR HIV WITHOUT PRESENCE OF RISK FACTORS: ICD-10-CM

## 2022-11-01 PROBLEM — M25.561 RIGHT KNEE PAIN: Status: RESOLVED | Noted: 2019-03-01 | Resolved: 2022-11-01

## 2022-11-01 PROCEDURE — 90472 IMMUNIZATION ADMIN EACH ADD: CPT | Performed by: NURSE PRACTITIONER

## 2022-11-01 PROCEDURE — 90471 IMMUNIZATION ADMIN: CPT | Performed by: NURSE PRACTITIONER

## 2022-11-01 PROCEDURE — 99395 PREV VISIT EST AGE 18-39: CPT | Mod: 25 | Performed by: NURSE PRACTITIONER

## 2022-11-01 PROCEDURE — 90746 HEPB VACCINE 3 DOSE ADULT IM: CPT | Performed by: NURSE PRACTITIONER

## 2022-11-01 PROCEDURE — 90715 TDAP VACCINE 7 YRS/> IM: CPT | Performed by: NURSE PRACTITIONER

## 2022-11-01 ASSESSMENT — PATIENT HEALTH QUESTIONNAIRE - PHQ9: CLINICAL INTERPRETATION OF PHQ2 SCORE: 0

## 2022-11-01 ASSESSMENT — FIBROSIS 4 INDEX: FIB4 SCORE: 0.75

## 2022-11-01 NOTE — ASSESSMENT & PLAN NOTE
Chronic and stable condition.    He has been working on his diet and they are eating more chicken and fish at home.  At times he does have fast food or burger if he is short on time.    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

## 2022-11-01 NOTE — PROGRESS NOTES
Subjective:     CC:   Chief Complaint   Patient presents with    Annual Exam     PE        HPI:   Herman Boyle is a 32 y.o. male who presents for an annual exam. He is feeling well and has no complaints.      Anticipatory Guidance  Advanced directive: NA   PT/vit D for falls prevention: NA   Cholesterol Screening: per his screening with his job they have notified him his cholesterol is elevated    Diabetes Screening: New labs ordered   AAA Screening: NA   Diet: fish, red meat veggies, sometimes fast food   Exercise: running mostly- works as a police offices for [employer]   Substance Abuse: None   Safe in relationship.   Seat belts, bike/motorcycle helmet, gun safety discussed.  Sun protection used.  Dentist- follows annually   Eye doctor- does not see one    Cancer screening  Colorectal Cancer Screening: NA    Lung Cancer Screening: NA    Prostate Cancer Screening/PSA: Na     Infectious disease screening/Immunizations  --STI Screening: new orders place  --Practices safe sex.  --HIV Screening: new orders placed   --Hepatitis C Screening: NA   --Immunizations:    Influenza: Not interested    HPV:  NA    Tetanus: Due today     Shingles: n/a    Pneumococcal : NA     COVID-19: Completed series  Other immunizations: wants to start Hep B immunization      He  has a past medical history of Dyslipidemia (3/1/2019) and Right knee pain (3/1/2019).    He has no past medical history of Diabetes (HCC) or Hypertension.  He  has no past surgical history on file.  Family History   Problem Relation Age of Onset    Diabetes Mother     Alcohol abuse Father      Social History     Tobacco Use    Smoking status: Former     Packs/day: 0.00     Types: Cigarettes     Quit date: 3/29/2017     Years since quittin.5    Smokeless tobacco: Former     Types: Chew    Tobacco comments:     occ   Vaping Use    Vaping Use: Never used   Substance Use Topics    Alcohol use: Yes     Alcohol/week: 2.4 oz     Types: 4 Shots of liquor per week      "Comment: 4 drinks a wk    Drug use: No       Patient Active Problem List    Diagnosis Date Noted    Allergies 12/10/2019    Moderate mixed hyperlipidemia not requiring statin therapy 03/01/2019       Current Outpatient Medications   Medication Sig Dispense Refill    Triprolidine-Pseudoephedrine 1.25-30 MG Tab Take  by mouth.       No current facility-administered medications for this visit.    (including changes today)  Allergies: Cat hair extract    ROS    See HPI  Objective:     /70   Pulse 61   Temp 36.8 °C (98.3 °F) (Temporal)   Resp 14   Ht 1.727 m (5' 8\")   Wt 84.4 kg (186 lb)   SpO2 96%   BMI 28.28 kg/m²   Body mass index is 28.28 kg/m².  Wt Readings from Last 4 Encounters:   11/01/22 84.4 kg (186 lb)   04/14/21 75.1 kg (165 lb 9.6 oz)   03/29/21 74.6 kg (164 lb 6.4 oz)   12/10/19 81 kg (178 lb 8 oz)       Physical Exam  Constitutional:       Appearance: Normal appearance. He is normal weight.   HENT:      Head: Normocephalic and atraumatic.      Right Ear: Tympanic membrane, ear canal and external ear normal.      Left Ear: Tympanic membrane, ear canal and external ear normal.      Nose: Nose normal.      Mouth/Throat:      Mouth: Mucous membranes are moist.      Pharynx: Oropharynx is clear.   Eyes:      Extraocular Movements: Extraocular movements intact.      Conjunctiva/sclera: Conjunctivae normal.      Pupils: Pupils are equal, round, and reactive to light.   Cardiovascular:      Rate and Rhythm: Normal rate and regular rhythm.      Pulses: Normal pulses.      Heart sounds: Normal heart sounds.   Pulmonary:      Effort: Pulmonary effort is normal.      Breath sounds: Normal breath sounds.   Abdominal:      General: Abdomen is flat. Bowel sounds are normal.      Palpations: Abdomen is soft.   Musculoskeletal:         General: Normal range of motion.      Cervical back: Normal range of motion and neck supple.   Skin:     General: Skin is warm and dry.      Capillary Refill: Capillary refill " takes less than 2 seconds.   Neurological:      General: No focal deficit present.      Mental Status: He is alert and oriented to person, place, and time. Mental status is at baseline.          Assessment and Plan:     Moderate mixed hyperlipidemia not requiring statin therapy  Chronic and stable condition.    He has been working on his diet and they are eating more chicken and fish at home.  At times he does have fast food or burger if he is short on time.    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  Vaccinations per orders.  Counseling about diet, supplements, exercise, skin care and safe sex.    Follow-up: Return in about 1 year (around 11/1/2023) for annual physical exam.    Please note that this dictation was created using voice recognition software. I have made every reasonable attempt to correct obvious errors, but I expect that there are errors of grammar and possibly content that I did not discover before finalizing the note.

## 2022-11-05 DIAGNOSIS — E78.2 MODERATE MIXED HYPERLIPIDEMIA NOT REQUIRING STATIN THERAPY: ICD-10-CM

## 2022-11-05 DIAGNOSIS — Z11.4 SCREENING FOR HIV WITHOUT PRESENCE OF RISK FACTORS: ICD-10-CM

## 2022-11-05 DIAGNOSIS — Z00.00 PHYSICAL EXAM, ANNUAL: ICD-10-CM

## 2022-11-05 DIAGNOSIS — Z11.3 SCREENING FOR STD (SEXUALLY TRANSMITTED DISEASE): ICD-10-CM

## 2022-12-06 ENCOUNTER — NON-PROVIDER VISIT (OUTPATIENT)
Dept: MEDICAL GROUP | Facility: PHYSICIAN GROUP | Age: 32
End: 2022-12-06
Payer: COMMERCIAL

## 2022-12-06 ENCOUNTER — APPOINTMENT (OUTPATIENT)
Dept: MEDICAL GROUP | Facility: PHYSICIAN GROUP | Age: 32
End: 2022-12-06
Payer: COMMERCIAL

## 2022-12-06 DIAGNOSIS — Z23 NEED FOR VACCINATION: ICD-10-CM

## 2022-12-06 PROCEDURE — 90471 IMMUNIZATION ADMIN: CPT | Performed by: STUDENT IN AN ORGANIZED HEALTH CARE EDUCATION/TRAINING PROGRAM

## 2022-12-06 PROCEDURE — 90746 HEPB VACCINE 3 DOSE ADULT IM: CPT | Performed by: STUDENT IN AN ORGANIZED HEALTH CARE EDUCATION/TRAINING PROGRAM

## 2023-05-12 ENCOUNTER — OFFICE VISIT (OUTPATIENT)
Dept: MEDICAL GROUP | Facility: PHYSICIAN GROUP | Age: 33
End: 2023-05-12
Payer: COMMERCIAL

## 2023-05-12 VITALS
SYSTOLIC BLOOD PRESSURE: 118 MMHG | TEMPERATURE: 97.3 F | OXYGEN SATURATION: 96 % | DIASTOLIC BLOOD PRESSURE: 70 MMHG | RESPIRATION RATE: 12 BRPM | WEIGHT: 180.2 LBS | HEART RATE: 54 BPM | BODY MASS INDEX: 27.31 KG/M2 | HEIGHT: 68 IN

## 2023-05-12 DIAGNOSIS — Z23 NEED FOR VACCINATION: ICD-10-CM

## 2023-05-12 DIAGNOSIS — H61.893: ICD-10-CM

## 2023-05-12 PROCEDURE — 99213 OFFICE O/P EST LOW 20 MIN: CPT | Mod: 25 | Performed by: NURSE PRACTITIONER

## 2023-05-12 PROCEDURE — 3074F SYST BP LT 130 MM HG: CPT | Performed by: NURSE PRACTITIONER

## 2023-05-12 PROCEDURE — 3078F DIAST BP <80 MM HG: CPT | Performed by: NURSE PRACTITIONER

## 2023-05-12 PROCEDURE — 90471 IMMUNIZATION ADMIN: CPT | Performed by: NURSE PRACTITIONER

## 2023-05-12 PROCEDURE — 90746 HEPB VACCINE 3 DOSE ADULT IM: CPT | Performed by: NURSE PRACTITIONER

## 2023-05-12 ASSESSMENT — ENCOUNTER SYMPTOMS
FEVER: 0
SHORTNESS OF BREATH: 0
CHILLS: 0

## 2023-05-12 ASSESSMENT — PATIENT HEALTH QUESTIONNAIRE - PHQ9: CLINICAL INTERPRETATION OF PHQ2 SCORE: 0

## 2023-05-12 NOTE — PROGRESS NOTES
"CC:  Chief Complaint   Patient presents with    Bump     States noticed bumps on ears        HISTORY OF PRESENT ILLNESS: Patient is a 32 y.o. male established patient presenting     Problem   Nodule of External Ear, Bilateral    Onset 1.5 weeks ago   Not painful or itching  No erythema or drainage noted  Feels that the left one has grown              Review of Systems   Constitutional:  Negative for chills and fever.   Respiratory:  Negative for shortness of breath.    Cardiovascular:  Negative for chest pain.   Skin:         2 bumps on left and right ear             - NOTE: All other systems reviewed and are negative, except as in HPI.      Exam:    /70   Pulse (!) 54   Temp 36.3 °C (97.3 °F) (Temporal)   Resp 12   Ht 1.727 m (5' 8\")   Wt 81.7 kg (180 lb 3.2 oz)   SpO2 96%  Body mass index is 27.4 kg/m².    Physical Exam  Constitutional:       Appearance: Normal appearance. He is normal weight.   HENT:      Head: Normocephalic and atraumatic.      Right Ear: Hearing, tympanic membrane and ear canal normal. No drainage, swelling or tenderness.      Left Ear: Hearing, tympanic membrane and ear canal normal. No drainage, swelling or tenderness.      Ears:     Pulmonary:      Effort: Pulmonary effort is normal.   Musculoskeletal:         General: Normal range of motion.      Cervical back: Normal range of motion.   Skin:     General: Skin is warm and dry.   Neurological:      Mental Status: He is alert and oriented to person, place, and time.   Psychiatric:         Mood and Affect: Mood normal.         Behavior: Behavior normal.         Thought Content: Thought content normal.         Judgment: Judgment normal.           Assessment/Plan:    1. Need for vaccination  - Hepatitis B Vaccine Adult 20+    2. Nodule of external ear, bilateral  Acute uncomplicated condition.  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     If symptoms persist FU w/PCP, if symptoms worsen go to emergency room.     If experiencing any side effects from prescribed medications reports to the office immediately or go to emergency room.    Reviewed indication, dosage, usage and potential adverse effects of prescribed medications.     Reviewed risks and benefits of treatment plan. Patient verbalizes understanding of all instruction and verbally agrees to plan.      Return for Pending follow-ups with specialists.      Please note that this dictation was created using voice recognition software. I have made every reasonable attempt to correct obvious errors, but I expect that there are errors of grammar and possibly content that I did not discover before finalizing the note.

## 2023-06-06 ENCOUNTER — APPOINTMENT (OUTPATIENT)
Dept: MEDICAL GROUP | Facility: PHYSICIAN GROUP | Age: 33
End: 2023-06-06
Payer: COMMERCIAL

## 2023-06-08 ENCOUNTER — APPOINTMENT (RX ONLY)
Dept: URBAN - METROPOLITAN AREA CLINIC 31 | Facility: CLINIC | Age: 33
Setting detail: DERMATOLOGY
End: 2023-06-08

## 2023-06-08 DIAGNOSIS — M06.3 RHEUMATOID NODULE: ICD-10-CM

## 2023-06-08 PROBLEM — L30.9 DERMATITIS, UNSPECIFIED: Status: ACTIVE | Noted: 2023-06-08

## 2023-06-08 PROCEDURE — 69100 BIOPSY OF EXTERNAL EAR: CPT

## 2023-06-08 PROCEDURE — ? BIOPSY BY SHAVE METHOD

## 2023-06-08 PROCEDURE — ? ADDITIONAL NOTES

## 2023-06-08 ASSESSMENT — LOCATION SIMPLE DESCRIPTION DERM: LOCATION SIMPLE: LEFT EAR

## 2023-06-08 ASSESSMENT — LOCATION DETAILED DESCRIPTION DERM: LOCATION DETAILED: LEFT SUPERIOR CRUS OF ANTIHELIX

## 2023-06-08 ASSESSMENT — LOCATION ZONE DERM: LOCATION ZONE: EAR

## 2023-06-08 NOTE — PROCEDURE: ADDITIONAL NOTES
Render Risk Assessment In Note?: no
Detail Level: Detailed
Additional Notes: Pt has matching lesion R ear, no history of trauma, no wearing helmets, straps, etc....

## 2023-08-22 ENCOUNTER — OFFICE VISIT (OUTPATIENT)
Dept: MEDICAL GROUP | Facility: PHYSICIAN GROUP | Age: 33
End: 2023-08-22
Payer: COMMERCIAL

## 2023-08-22 VITALS
HEART RATE: 52 BPM | HEIGHT: 68 IN | WEIGHT: 175 LBS | BODY MASS INDEX: 26.52 KG/M2 | TEMPERATURE: 97.4 F | DIASTOLIC BLOOD PRESSURE: 74 MMHG | RESPIRATION RATE: 16 BRPM | SYSTOLIC BLOOD PRESSURE: 126 MMHG | OXYGEN SATURATION: 97 %

## 2023-08-22 DIAGNOSIS — F41.9 ANXIETY: ICD-10-CM

## 2023-08-22 PROCEDURE — 99214 OFFICE O/P EST MOD 30 MIN: CPT | Performed by: NURSE PRACTITIONER

## 2023-08-22 PROCEDURE — 3074F SYST BP LT 130 MM HG: CPT | Performed by: NURSE PRACTITIONER

## 2023-08-22 PROCEDURE — 3078F DIAST BP <80 MM HG: CPT | Performed by: NURSE PRACTITIONER

## 2023-08-22 RX ORDER — HYDROXYZINE HYDROCHLORIDE 25 MG/1
25 TABLET, FILM COATED ORAL 3 TIMES DAILY PRN
Qty: 60 TABLET | Refills: 0 | Status: SHIPPED | OUTPATIENT
Start: 2023-08-22 | End: 2023-09-05 | Stop reason: SDUPTHER

## 2023-08-22 ASSESSMENT — ENCOUNTER SYMPTOMS
DIZZINESS: 0
INSOMNIA: 0
NERVOUS/ANXIOUS: 1
CHILLS: 0
WEIGHT LOSS: 0
DEPRESSION: 0
SHORTNESS OF BREATH: 0
FEVER: 0
HEADACHES: 0

## 2023-08-22 NOTE — PROGRESS NOTES
"CC:  Chief Complaint   Patient presents with    Anxiety     New, going through a big change, panic attack,        HISTORY OF PRESENT ILLNESS: Patient is a 33 y.o. male established patient presenting     Problem   Anxiety    Notes he is starting to get anxiety about anew move to [place] d him not having a job. states wife is relocating for work and he is going with her but he does not have job yet and the pay difference is no good and they just bought a house  Has been building up but in the last few days has started to get panic attacks  Wakes up worrying about new move  Is interested in getting medication for his anxiety   Notes he is struggling focusing at work              Review of Systems   Constitutional:  Negative for chills, fever, malaise/fatigue and weight loss.   Respiratory:  Negative for shortness of breath.    Cardiovascular:  Negative for chest pain.   Neurological:  Negative for dizziness and headaches.   Psychiatric/Behavioral:  Negative for depression and suicidal ideas. The patient is nervous/anxious. The patient does not have insomnia.              - NOTE: All other systems reviewed and are negative, except as in HPI.      Exam:    /74   Pulse (!) 52   Temp 36.3 °C (97.4 °F) (Temporal)   Resp 16   Ht 1.727 m (5' 8\")   Wt 79.4 kg (175 lb)   SpO2 97%  Body mass index is 26.61 kg/m².    Physical Exam  Constitutional:       General: He is not in acute distress.     Appearance: Normal appearance. He is not ill-appearing.   HENT:      Head: Normocephalic and atraumatic.   Pulmonary:      Effort: Pulmonary effort is normal.   Musculoskeletal:         General: Normal range of motion.   Neurological:      Mental Status: He is alert and oriented to person, place, and time.   Psychiatric:         Mood and Affect: Mood normal.         Behavior: Behavior normal.         Thought Content: Thought content normal.         Judgment: Judgment normal.           Assessment/Plan:    1. Anxiety  Acute " uncomplicated condition.  Discussed at length with patient that doing behavioral therapy is an important adjunct to working with anxiety.  At this time patient would prefer to work with hydroxyzine.  We will follow-up with patient in roughly 1 to 2 weeks to see how he is doing.  At that time if he continues to have worsening anxiety we discussed possibility of doing BuSpar at nighttime to help him sleep.  - hydrOXYzine HCl (ATARAX) 25 MG Tab; Take 1 Tablet by mouth 3 times a day as needed for Anxiety.  Dispense: 60 Tablet; Refill: 0       Discussed with patient possible alternative diagnoses, patient is to take all medications as prescribed.     If symptoms persist FU w/PCP, if symptoms worsen go to emergency room.     If experiencing any side effects from prescribed medications reports to the office immediately or go to emergency room.    Reviewed indication, dosage, usage and potential adverse effects of prescribed medications.     Reviewed risks and benefits of treatment plan. Patient verbalizes understanding of all instruction and verbally agrees to plan.      Return in about 2 weeks (around 9/5/2023) for Follow-up anxiety.      Please note that this dictation was created using voice recognition software. I have made every reasonable attempt to correct obvious errors, but I expect that there are errors of grammar and possibly content that I did not discover before finalizing the note.

## 2023-09-05 ENCOUNTER — TELEMEDICINE (OUTPATIENT)
Dept: MEDICAL GROUP | Facility: PHYSICIAN GROUP | Age: 33
End: 2023-09-05
Payer: COMMERCIAL

## 2023-09-05 DIAGNOSIS — F41.9 ANXIETY: ICD-10-CM

## 2023-09-05 PROCEDURE — 99214 OFFICE O/P EST MOD 30 MIN: CPT | Mod: 95 | Performed by: NURSE PRACTITIONER

## 2023-09-05 RX ORDER — HYDROXYZINE HYDROCHLORIDE 25 MG/1
50 TABLET, FILM COATED ORAL NIGHTLY PRN
Qty: 180 TABLET | Refills: 0 | Status: SHIPPED | OUTPATIENT
Start: 2023-09-05

## 2023-09-05 ASSESSMENT — ENCOUNTER SYMPTOMS
NERVOUS/ANXIOUS: 1
WEIGHT LOSS: 0
CHILLS: 0
HEADACHES: 0
FEVER: 0
SHORTNESS OF BREATH: 0
INSOMNIA: 1
DEPRESSION: 1
DIZZINESS: 0

## 2023-09-05 ASSESSMENT — LIFESTYLE VARIABLES: SUBSTANCE_ABUSE: 0

## 2023-09-05 NOTE — PROGRESS NOTES
Virtual Visit: Established Patient   This visit was conducted via Zoom using secure and encrypted videoconferencing technology.   The patient was in their home in the state [facility].    The patient's identity was confirmed and verbal consent was obtained for this virtual visit.     Subjective:   CC:   Chief Complaint   Patient presents with    Follow-Up     On anxiety       Herman Boyle is a 33 y.o. male presenting for evaluation and management of:    Problem   Anxiety    Recently found out that he is getting a divorce and is unsure about the next part of life  Has been using his atarax 50 mg at night to help him sleep  With the recent news of the divorce he has been having moments here and there but states the atarax at night has helped a ton  Would like to continue   Denies SI/HI    8/22/2023  Notes he is starting to get anxiety about a new move to [place] and him not having a job set up yet. Has been building up but in the last few days has started to get panic attacks               ROS   Review of Systems   Constitutional:  Negative for chills, fever, malaise/fatigue and weight loss.   Respiratory:  Negative for shortness of breath.    Cardiovascular:  Negative for chest pain.   Neurological:  Negative for dizziness and headaches.   Psychiatric/Behavioral:  Positive for depression. Negative for substance abuse and suicidal ideas. The patient is nervous/anxious and has insomnia.          Current medicines (including changes today)  Current Outpatient Medications   Medication Sig Dispense Refill    hydrOXYzine HCl (ATARAX) 25 MG Tab Take 2 Tablets by mouth at bedtime as needed for Anxiety. 180 Tablet 0     No current facility-administered medications for this visit.       Patient Active Problem List    Diagnosis Date Noted    Anxiety 08/22/2023    Nodule of external ear, bilateral 05/12/2023    Allergies 12/10/2019    Moderate mixed hyperlipidemia not requiring statin therapy 03/01/2019        Objective:    There were no vitals taken for this visit.    Physical Exam:  Constitutional: Alert, no distress, well-groomed.  Skin: No rashes in visible areas.  Eye: Round. Conjunctiva clear, lids normal. No icterus.   ENMT: Lips pink without lesions, good dentition, moist mucous membranes. Phonation normal.  Neck: No masses, no thyromegaly. Moves freely without pain.  Respiratory: Unlabored respiratory effort, no cough or audible wheeze  Psych: Alert and oriented x3, normal affect and mood.     Assessment and Plan:   The following treatment plan was discussed:     1. Anxiety  Chronic and stable condition   Needs refills  - hydrOXYzine HCl (ATARAX) 25 MG Tab; Take 2 Tablets by mouth at bedtime as needed for Anxiety.  Dispense: 180 Tablet; Refill: 0      Follow-up: Return in about 6 months (around 3/5/2024) for follow up anxiety .

## 2023-11-29 ENCOUNTER — OFFICE VISIT (OUTPATIENT)
Dept: MEDICAL GROUP | Facility: PHYSICIAN GROUP | Age: 33
End: 2023-11-29
Payer: COMMERCIAL

## 2023-11-29 VITALS
SYSTOLIC BLOOD PRESSURE: 114 MMHG | HEIGHT: 68 IN | HEART RATE: 71 BPM | OXYGEN SATURATION: 94 % | TEMPERATURE: 98.2 F | WEIGHT: 187 LBS | DIASTOLIC BLOOD PRESSURE: 70 MMHG | BODY MASS INDEX: 28.34 KG/M2 | RESPIRATION RATE: 12 BRPM

## 2023-11-29 DIAGNOSIS — Z00.00 WELL ADULT EXAM: ICD-10-CM

## 2023-11-29 DIAGNOSIS — F41.9 ANXIETY: ICD-10-CM

## 2023-11-29 DIAGNOSIS — M25.562 CHRONIC PAIN OF BOTH KNEES: ICD-10-CM

## 2023-11-29 DIAGNOSIS — T78.40XS ALLERGY, SEQUELA: ICD-10-CM

## 2023-11-29 DIAGNOSIS — G89.29 CHRONIC PAIN OF BOTH KNEES: ICD-10-CM

## 2023-11-29 DIAGNOSIS — M25.511 CHRONIC RIGHT SHOULDER PAIN: ICD-10-CM

## 2023-11-29 DIAGNOSIS — M25.561 CHRONIC PAIN OF BOTH KNEES: ICD-10-CM

## 2023-11-29 DIAGNOSIS — Z11.4 SCREENING FOR HIV (HUMAN IMMUNODEFICIENCY VIRUS): ICD-10-CM

## 2023-11-29 DIAGNOSIS — H61.893: ICD-10-CM

## 2023-11-29 DIAGNOSIS — G89.29 CHRONIC RIGHT SHOULDER PAIN: ICD-10-CM

## 2023-11-29 DIAGNOSIS — E78.2 MODERATE MIXED HYPERLIPIDEMIA NOT REQUIRING STATIN THERAPY: ICD-10-CM

## 2023-11-29 PROCEDURE — 3074F SYST BP LT 130 MM HG: CPT | Performed by: NURSE PRACTITIONER

## 2023-11-29 PROCEDURE — 3078F DIAST BP <80 MM HG: CPT | Performed by: NURSE PRACTITIONER

## 2023-11-29 PROCEDURE — 99395 PREV VISIT EST AGE 18-39: CPT | Performed by: NURSE PRACTITIONER

## 2023-11-29 ASSESSMENT — ENCOUNTER SYMPTOMS
FEVER: 0
PALPITATIONS: 0
CHILLS: 0
NEUROLOGICAL NEGATIVE: 1
BACK PAIN: 1
NERVOUS/ANXIOUS: 1
EYES NEGATIVE: 1
GASTROINTESTINAL NEGATIVE: 1
WEIGHT LOSS: 0
SHORTNESS OF BREATH: 0

## 2023-11-30 NOTE — PROGRESS NOTES
Subjective:     CC:   Chief Complaint   Patient presents with    Annual Exam     PE        HPI:   Herman Boyle is a 33 y.o. male who presents for an annual exam. He is feeling well and has no complaints.      Anticipatory Guidance  Advanced directive: NA   PT/vit D for falls prevention: NA   Cholesterol Screening: new orders placed   Diabetes Screening: new orders placed   AAA Screening: NA   Diet: BMI 28.43 notes he has been staying away from burgers and doing more chicken, rice, beans- but still eating out   Exercise: BMI 28.43 tries to go to gym but with back pain, shoulder, elbow, knee pain he is not going to gym as much  Substance Abuse: None  Seat belts, bike/motorcycle  helmet, gun safety discussed.  Discussed smoke detectors and CO2 detectors and recommended changing out annually.  Sun protection used. Discussed the importance of daily sunscreen use.   Dentist: follows with Sylvia - every 6 months  Eye doctor: not seen one    Cancer screening  Colorectal Cancer Screening: NA    Lung Cancer Screening: NA    Prostate Cancer Screening/PSA: NA     Infectious disease screening/Immunizations  --STI Screening: new order  --Practices safe sex.  --HIV Screening: new orders placed   --Hepatitis C Screening: completed    --Immunizations:    Influenza: completed    HPV:  completed younger    Tetanus: UTD    Shingles: n/a    Pneumococcal : NA     COVID-19: completed series    He  has a past medical history of Dyslipidemia (3/1/2019) and Right knee pain (3/1/2019).    He has no past medical history of Diabetes (HCC) or Hypertension.  He  has no past surgical history on file.  Family History   Problem Relation Age of Onset    Diabetes Mother     Alcohol abuse Father      Social History     Tobacco Use    Smoking status: Former     Current packs/day: 0.00     Types: Cigarettes     Quit date: 3/29/2017     Years since quittin.6    Smokeless tobacco: Former     Types: Chew    Tobacco comments:     occ   Vaping Use  "   Vaping Use: Never used   Substance Use Topics    Alcohol use: Yes     Alcohol/week: 2.4 oz     Types: 4 Shots of liquor per week     Comment: 4 drinks a wk    Drug use: No       Patient Active Problem List    Diagnosis Date Noted    Right shoulder pain 11/29/2023    Chronic pain of both knees 11/29/2023    Anxiety 08/22/2023    Nodule of external ear, bilateral 05/12/2023    Allergies 12/10/2019    Moderate mixed hyperlipidemia not requiring statin therapy 03/01/2019       Current Outpatient Medications   Medication Sig Dispense Refill    hydrOXYzine HCl (ATARAX) 25 MG Tab Take 2 Tablets by mouth at bedtime as needed for Anxiety. 180 Tablet 0     No current facility-administered medications for this visit.    (including changes today)  Allergies: Cat hair extract    Review of Systems   Constitutional:  Negative for chills, fever, malaise/fatigue and weight loss.   HENT: Negative.     Eyes: Negative.    Respiratory:  Negative for shortness of breath.    Cardiovascular:  Negative for chest pain and palpitations.   Gastrointestinal: Negative.    Genitourinary: Negative.    Musculoskeletal:  Positive for back pain and joint pain.   Skin: Negative.    Neurological: Negative.    Psychiatric/Behavioral:  The patient is nervous/anxious.          Objective:     /70   Pulse 71   Temp 36.8 °C (98.2 °F) (Temporal)   Resp 12   Ht 1.727 m (5' 8\")   Wt 84.8 kg (187 lb)   SpO2 94%   BMI 28.43 kg/m²   Body mass index is 28.43 kg/m².  Wt Readings from Last 4 Encounters:   11/29/23 84.8 kg (187 lb)   08/22/23 79.4 kg (175 lb)   05/12/23 81.7 kg (180 lb 3.2 oz)   11/01/22 84.4 kg (186 lb)       Physical Exam  Constitutional:       General: He is not in acute distress.     Appearance: Normal appearance. He is normal weight. He is not ill-appearing.   HENT:      Head: Normocephalic and atraumatic.      Right Ear: Tympanic membrane, ear canal and external ear normal.      Left Ear: Tympanic membrane, ear canal and external " ear normal.      Nose: Nose normal.      Mouth/Throat:      Mouth: Mucous membranes are moist.      Pharynx: Oropharynx is clear.   Eyes:      Extraocular Movements: Extraocular movements intact.      Conjunctiva/sclera: Conjunctivae normal.      Pupils: Pupils are equal, round, and reactive to light.   Cardiovascular:      Rate and Rhythm: Normal rate and regular rhythm.      Pulses: Normal pulses.      Heart sounds: Normal heart sounds.   Pulmonary:      Effort: Pulmonary effort is normal.      Breath sounds: Normal breath sounds.   Abdominal:      General: Abdomen is flat. Bowel sounds are normal.      Palpations: Abdomen is soft.   Musculoskeletal:      Right shoulder: Tenderness present. No swelling. Decreased range of motion. Normal strength.      Left shoulder: Normal.      Cervical back: Normal range of motion.      Comments: Pain into joint area, negative apley, empty can   Skin:     General: Skin is warm.      Capillary Refill: Capillary refill takes less than 2 seconds.   Neurological:      Mental Status: He is alert.   Psychiatric:         Behavior: Behavior normal.            Assessment and Plan:     Problem   Right Shoulder Pain    Chronic and stable condition for many years  Works as  with [employer]  Has been in [facility] since he was 16yo   Uses ibuprofen for pain as needed     Chronic Pain of Both Knees    Chronic and stable condition for many years  Works as  with [employer]  Has been in [facility] since he was 16yo   Uses ibuprofen for pain as needed     Anxiety    Chronic and stable condition   Follows with counseling   getting a divorce   Looking for house in [place] to move to   Takes atarax 50 mg at night to help him sleep  Denies SI/HI     Nodule of External Ear, Bilateral    Chronic and stable condition   Was seen with dermatology and they were benign growths     Allergies    Chronic and stable condition  Allergic to local pollens, cats, dogs, some trees,  Denies SOB, wheezing.   Does  not take anything for this         Moderate Mixed Hyperlipidemia Not Requiring Statin Therapy    Chronic and stable condition   Works on better diet and exercise  Labs from last year show cholesterol- 218, tri 249, , HDL 41  Active with his job for [employer]  Needs new labs            HCM: UTD.  Labs per orders.  Vaccinations per orders.  Counseling about diet, supplements, exercise, skin care and safe sex.    Follow-up: Return for pending labs and referrals.    Please note that this dictation was created using voice recognition software. I have made every reasonable attempt to correct obvious errors, but I expect that there are errors of grammar and possibly content that I did not discover before finalizing the note.

## 2024-01-31 ENCOUNTER — PHYSICAL THERAPY (OUTPATIENT)
Dept: PHYSICAL THERAPY | Facility: MEDICAL CENTER | Age: 34
End: 2024-01-31
Attending: NURSE PRACTITIONER
Payer: COMMERCIAL

## 2024-01-31 DIAGNOSIS — M25.511 CHRONIC RIGHT SHOULDER PAIN: ICD-10-CM

## 2024-01-31 DIAGNOSIS — M25.561 CHRONIC PAIN OF BOTH KNEES: ICD-10-CM

## 2024-01-31 DIAGNOSIS — G89.29 CHRONIC RIGHT SHOULDER PAIN: ICD-10-CM

## 2024-01-31 DIAGNOSIS — M25.562 CHRONIC PAIN OF BOTH KNEES: ICD-10-CM

## 2024-01-31 DIAGNOSIS — G89.29 CHRONIC PAIN OF BOTH KNEES: ICD-10-CM

## 2024-01-31 PROCEDURE — 97110 THERAPEUTIC EXERCISES: CPT

## 2024-01-31 PROCEDURE — 97162 PT EVAL MOD COMPLEX 30 MIN: CPT

## 2024-01-31 ASSESSMENT — ENCOUNTER SYMPTOMS
PAIN SCALE: 0
QUALITY: ACHING

## 2024-01-31 NOTE — OP THERAPY EVALUATION
"   Outpatient Physical Therapy  INITIAL EVALUATION    [facility] Outpatient Physical Therapy  [address]  [facility] [address]  Phone:  [phone]  Fax:  [phone]    Date of Evaluation: 2024    Patient: Herman Boyle  YOB: 1990  MRN: [id number]     Referring Provider: JOANA Evans  [address]  [place] [address]   Referring Diagnosis Chronic right shoulder pain [M25.511, G89.29];Chronic pain of both knees [M25.561, M25.562, G89.29]     Time Calculation  Start time: 0815  Stop time: 0856 Time Calculation (min): 41 minutes         Chief Complaint: Shoulder Problem    Visit Diagnoses     ICD-10-CM   1. Chronic right shoulder pain  M25.511    G89.29   2. Chronic pain of both knees  M25.561    M25.562    G89.29       Date of onset of impairment: No data found    Subjective:   History of Present Illness:     Mechanism of injury:  Patient is a 33 year old male with a PMH including: Anxiety, R shoulder pain, dyslipidemia, R knee pain.    Pt referred for B knee pain and R shoulder pain; he would like to initiate with shoulder pain today. Chief complaints include pain with GH IR; notices popping in joint with activity and overhead movement. Pain-free at rest. 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 Overall, worsening with time. He is currently working as a  in [place]. Shoulder pain is impacting work, chore and ADL-related tasks. Notices some n/t in the top of the shoulder; mostly observes this at night. No dropping of objects/observable strength deficits and no increase with valsalva. Denies neck pain. Has hx of tinnitus. Has hx of HA's but no new changes.                  Pain:     Current pain ratin    Location:  Superior shoulders    Quality:  Aching (popping)    Pain " Comments::  Aggravating: shoulder elevation and movement      Hand dominance:  Right  Diagnostic Tests:     None    Activities of Daily Living:     Patient reported ADL status: Patient's current daily routine includes:  Work: [employer]    Exercise: tries to go to gym but with back pain, shoulder, elbow, knee pain he is not going to gym as much per PCP note         Patient Goals:     Patient goals for therapy:  Decreased pain      Past Medical History:   Diagnosis Date   • Dyslipidemia 3/1/2019   • Right knee pain 3/1/2019     No past surgical history on file.  Social History     Tobacco Use   • Smoking status: Former     Current packs/day: 0.00     Types: Cigarettes     Quit date: 3/29/2017     Years since quittin.8   • Smokeless tobacco: Former     Types: Chew   • Tobacco comments:     occ   Substance Use Topics   • Alcohol use: Yes     Alcohol/week: 2.4 oz     Types: 4 Shots of liquor per week     Comment: 4 drinks a wk     Family and Occupational History     Socioeconomic History   • Marital status:      Spouse name: Not on file   • Number of children: Not on file   • Years of education: Not on file   • Highest education level: Not on file   Occupational History   • Not on file       Objective     Observations     Additional Observation Details        Postural Observations  Seated posture: poor    Additional Postural Observation Details  Forward head and rounded shoulders    Cervical Screen    Cervical range of motion within normal limits with the following exceptions: 44 deg rottion to the L and 46 deg to the R  SB, c/s extension and flexion, and c/s retraction grossly WFL     Thoracic Screen    Thoracic range of motion within normal limits with the following exceptions: T/s extension limited with pain in the L shoulder  Rotation limited B    Palpation     Additional Palpation Details  No TTTp posterior RC musculature B    Tenderness     Additional Tenderness Details  No palpable tenderness  to post RC musculature B    Active Range of Motion   Left Shoulder   Flexion: 141 degrees   Abduction: 101 degrees   External rotation 0°: 69 degrees   Internal rotation 0°: WFL    Right Shoulder   Flexion: 132 degrees   Abduction: 128 degrees   External rotation 0°: 65 degrees   Internal rotation 0°: WFL    Additional Active Range of Motion Details  *Pt seated for AROM testing  ERP with all ROM listed above with B shoulders    Joint Play   Left Shoulder     Anterior capsule: within functional limits    Posterior capsule: within functional limits  Right Shoulder     Anterior capsule: within functional limits    Posterior capsule: within functional limits    Strength:      Additional Strength Details  GH isometrics: ( set up: 0 deg GH abd and elbow 90 deg)    L GH: all WFL    L GH:  ER: Mod and painful    R GH:  IR: Mod and painful  ER: Mod and painful        Tests     Left Shoulder   Positive Hawkin's and Neer's.   Negative painful arc.     Right Shoulder   Positive Hawkin's and Neer's.   Negative painful arc.     Additional Tests Details  Compression rotation test: pos B        Therapeutic Exercises (CPT 40999):     1. pt education, re: PT exam findings and POC    2. new hep as below      Therapeutic Exercise Summary: Access Code: [email or web address]  URL: [email or web address]  Date: 01/31/2024  Prepared by: Mike Malhotra    Exercises  - Correct Seated Posture  - 7 x weekly  - Shoulder External Rotation and Scapular Retraction with Resistance  - 2 x daily - 7 x weekly - 2-3 sets - 1-3 hold  - Shoulder Flexion Serratus Activation with Resistance  - 2 x daily - 7 x weekly - 2 sets - 15 reps    Pt performed these exercises with instruction and SPV.  Provided handout with these exercises for daily HEP.        Time-based treatments/modalities:    Physical Therapy Timed Treatment Charges  Therapeutic exercise minutes (CPT 26044): 11 minutes      Assessment, Response and Plan:   Impairments: abnormal or restricted ROM,  "impaired physical strength and lacks appropriate home exercise program    Assessment details:  Patient is a pleasant and cooperative R hand dominant 33 year old male, with chief complaint of B shoulder pain following miliary incident in 2010 involving being pulled up as \"dead weight\" by a sling. He is currently working as a  and is going to gym intermittently due to pain complaints. Exam findings suggestive of possible labral impairment, poor postural awareness, poor periscapular strength and endurance. Pt may benefit from skilled physical therapy in order to address above impairments in order to improve QOL and return to reported ADL's.     Prognosis comment:  Good/fair  Goals:   Short Term Goals:   1. Pt will be independent with written HEP.      Short term goal time span:  2-4 weeks      Long Term Goals:    1. Pt will be independent with written HEP.  2. Pt will have a sig improvement in Quickdash score (eval: 18.18)  3. Pt will be able to raise B shoulders overhead with 10 deg or more B without increase in s/s in order to complete normal ADL's and self-care.   4. Pt will be able to return to gym at least 3x/wk and complete lifting routine with min to no modifications and improve QOL.  Long term goal time span:  6-8 weeks    Plan:   Therapy options:  Physical therapy treatment to continue  Planned therapy interventions:  Neuromuscular Re-education (CPT 43055), Gait Training (CPT 14593), Manual Therapy (CPT 80705) and Therapeutic Exercise (CPT 44145)  Frequency: 1-2x/wk.  Duration in weeks:  8  Discussed with:  Patient  Plan details:  UPOC: 3/29/24        Functional Assessment Used  Quickdash General Total Score: 18.18     Referring provider co-signature:  I have reviewed this plan of care and my co-signature certifies the need for services.    Certification Period: 01/31/2024 to  3/29/24    Physician Signature: ________________________________ Date: ______________                 "

## 2024-02-07 ENCOUNTER — APPOINTMENT (OUTPATIENT)
Dept: PHYSICAL THERAPY | Facility: MEDICAL CENTER | Age: 34
End: 2024-02-07
Attending: NURSE PRACTITIONER
Payer: COMMERCIAL

## 2024-02-12 ENCOUNTER — PHYSICAL THERAPY (OUTPATIENT)
Dept: PHYSICAL THERAPY | Facility: MEDICAL CENTER | Age: 34
End: 2024-02-12
Attending: NURSE PRACTITIONER
Payer: COMMERCIAL

## 2024-02-12 DIAGNOSIS — G89.29 CHRONIC RIGHT SHOULDER PAIN: ICD-10-CM

## 2024-02-12 DIAGNOSIS — M25.511 CHRONIC RIGHT SHOULDER PAIN: ICD-10-CM

## 2024-02-12 PROCEDURE — 97140 MANUAL THERAPY 1/> REGIONS: CPT

## 2024-02-12 PROCEDURE — 97110 THERAPEUTIC EXERCISES: CPT

## 2024-02-12 NOTE — OP THERAPY DAILY TREATMENT
"  Outpatient Physical Therapy  DAILY TREATMENT     [facility] Outpatient Physical Therapy  [address]  [place] [address]  Phone:  [phone]  Fax:  [phone]    Date: 02/12/2024    Patient: Herman Boyle  YOB: 1990  MRN: [id number]     Time Calculation    Start time: 0730  Stop time: 0811 Time Calculation (min): 41 minutes         Chief Complaint: Shoulder Problem    Visit #: 2    SUBJECTIVE:  Knee is bothering more, suspects this is due to temperature changes. Is not aware of significant shoulder pain currently with arms at rest. Stating exercises are \"fine.\"      OBJECTIVE:  Current objective measures:     Pre-treatment:  Right Shoulder   Flexion: 97 degrees     Left Shoulder   Flexion: 127 degrees     Post-treatment:  Right Shoulder   Flexion: 130 degrees     Left Shoulder   Flexion: 136 degrees       Therapeutic Exercises (CPT 05951):     1. UBE, x3 min; alt every 1 min CW and CCW, cardiovascular warm up    2. FR: open book pec stretch, alt GH flexion, and t/s extensions, x10    4. bird dogs, x10 ea, VC's to move shoulder to tolerable range only; about 45 deg    5. Qped shoulder circles, orange TB, 2x30 sec, hep    6. counter push ups, x15 3 sec hold, hep      Therapeutic Exercise Summary: Access Code: Access Code: [email or web address]  URL: [email or web address]  Date: 02/12/2024  Prepared by: Mike Malhotra    Exercises  - Correct Seated Posture  - 7 x weekly  - Shoulder External Rotation and Scapular Retraction with Resistance  - 2 x daily - 7 x weekly - 2-3 sets - 1-3 hold  - Shoulder Flexion Serratus Activation with Resistance  - 2 x daily - 7 x weekly - 2 sets - 15 reps  - Bird Dog  - 2 x daily - 7 x weekly - 2 sets - 10 reps - 5 sec hold  - Push-Up on Counter  - 3 x weekly - 1 sets - 15 reps - 3 sec hold  URL: [email or web address]  Date: 01/31/2024  Prepared by: Mike Malhotra    Exercises  - Correct Seated Posture  - 7 x weekly  - " "Shoulder External Rotation and Scapular Retraction with Resistance  - 2 x daily - 7 x weekly - 2-3 sets - 1-3 hold  - Shoulder Flexion Serratus Activation with Resistance  - 2 x daily - 7 x weekly - 2 sets - 15 reps    Pt performed these exercises with instruction and SPV.  Provided handout with these exercises for daily HEP.        Therapeutic Treatments and Modalities:     1. Manual Therapy (CPT 39041), see below    Therapeutic Treatment and Modalities Summary: Manual:  B scapular mobs all directions Grade III-IV with pt in SL (pillow b/w knees for comfort) and pillow barrier between pt and therapist followed by B subscap release x10.         Time-based treatments/modalities:    Physical Therapy Timed Treatment Charges  Manual therapy minutes (CPT 17132): 10 minutes  Therapeutic exercise minutes (CPT 19858): 31 minutes      Pain rating (1-10) before treatment:  0/10  Pain rating (1-10) after treatment: 6/10; \"sore\"  Pain Comments::  Aggravating: shoulder elevation and movement       ASSESSMENT:   Response to treatment:   Pt demonstrating improvements in shoulder ROM after manual to scapulae and periscapular strengthening. Pt mentioning soreness at end of session and good compliance to hep. Will continue to address periscapular stabilization.    PLAN/RECOMMENDATIONS:   Plan for treatment: therapy treatment to continue next visit.  Planned interventions for next visit: continue with current treatment.         "

## 2024-02-14 ENCOUNTER — APPOINTMENT (OUTPATIENT)
Dept: PHYSICAL THERAPY | Facility: MEDICAL CENTER | Age: 34
End: 2024-02-14
Attending: NURSE PRACTITIONER
Payer: COMMERCIAL

## 2024-02-19 ENCOUNTER — APPOINTMENT (OUTPATIENT)
Dept: PHYSICAL THERAPY | Facility: MEDICAL CENTER | Age: 34
End: 2024-02-19
Attending: NURSE PRACTITIONER
Payer: COMMERCIAL

## 2024-02-19 NOTE — OP THERAPY DAILY TREATMENT
Outpatient Physical Therapy  DAILY TREATMENT     [facility] Outpatient Physical Therapy  [address]  [place] [address]  Phone:  [phone]  Fax:  [phone]    Date: 02/19/2024    Patient: Herman Boyle  YOB: 1990  MRN: [id number]     Time Calculation                   Chief Complaint: shoulder pain   Visit #: 3    SUBJECTIVE:      OBJECTIVE:          Therapeutic Exercises (CPT 90773):     1. UBE, x3 min; alt every 1 min CW and CCW, cardiovascular warm up    2. FR: open book pec stretch, alt GH flexion, and t/s extensions, x10    4. bird dogs, x10 ea, VC's to move shoulder to tolerable range only; about 45 deg    5. Qped shoulder circles, orange TB, 2x30 sec, hep    6. counter push ups, x15 3 sec hold, hep      Therapeutic Exercise Summary: Access Code: Access Code: [email or web address]  URL: [email or web address]  Date: 02/12/2024  Prepared by: Mike Malhotra    Exercises  - Correct Seated Posture  - 7 x weekly  - Shoulder External Rotation and Scapular Retraction with Resistance  - 2 x daily - 7 x weekly - 2-3 sets - 1-3 hold  - Shoulder Flexion Serratus Activation with Resistance  - 2 x daily - 7 x weekly - 2 sets - 15 reps  - Bird Dog  - 2 x daily - 7 x weekly - 2 sets - 10 reps - 5 sec hold  - Push-Up on Counter  - 3 x weekly - 1 sets - 15 reps - 3 sec hold  URL: [email or web address]  Date: 01/31/2024  Prepared by: Mike Malhotra    Exercises  - Correct Seated Posture  - 7 x weekly  - Shoulder External Rotation and Scapular Retraction with Resistance  - 2 x daily - 7 x weekly - 2-3 sets - 1-3 hold  - Shoulder Flexion Serratus Activation with Resistance  - 2 x daily - 7 x weekly - 2 sets - 15 reps    Pt performed these exercises with instruction and SPV.  Provided handout with these exercises for daily HEP.        Therapeutic Treatments and Modalities:     1. Manual Therapy (CPT 14281), see below    Therapeutic Treatment and Modalities  Summary: Manual:  B scapular mobs all directions Grade III-IV with pt in SL (pillow b/w knees for comfort) and pillow barrier between pt and therapist followed by B subscap release x10.         Time-based treatments/modalities:               ASSESSMENT:   Response to treatment:     PLAN/RECOMMENDATIONS:   Plan for treatment: therapy treatment to continue next visit.  Planned interventions for next visit: continue with current treatment, manual therapy (CPT 30172), neuromuscular re-education (CPT 30405), and therapeutic exercise (CPT 76270).

## 2024-02-21 ENCOUNTER — APPOINTMENT (OUTPATIENT)
Dept: PHYSICAL THERAPY | Facility: MEDICAL CENTER | Age: 34
End: 2024-02-21
Attending: NURSE PRACTITIONER
Payer: COMMERCIAL

## 2024-02-26 ENCOUNTER — PHYSICAL THERAPY (OUTPATIENT)
Dept: PHYSICAL THERAPY | Facility: MEDICAL CENTER | Age: 34
End: 2024-02-26
Attending: NURSE PRACTITIONER
Payer: COMMERCIAL

## 2024-02-26 DIAGNOSIS — M25.562 CHRONIC PAIN OF BOTH KNEES: ICD-10-CM

## 2024-02-26 DIAGNOSIS — G89.29 CHRONIC PAIN OF BOTH KNEES: ICD-10-CM

## 2024-02-26 DIAGNOSIS — G89.29 CHRONIC RIGHT SHOULDER PAIN: ICD-10-CM

## 2024-02-26 DIAGNOSIS — M25.511 CHRONIC RIGHT SHOULDER PAIN: ICD-10-CM

## 2024-02-26 DIAGNOSIS — M25.561 CHRONIC PAIN OF BOTH KNEES: ICD-10-CM

## 2024-02-26 PROCEDURE — 97110 THERAPEUTIC EXERCISES: CPT

## 2024-02-26 PROCEDURE — 97140 MANUAL THERAPY 1/> REGIONS: CPT

## 2024-02-26 NOTE — OP THERAPY DAILY TREATMENT
Outpatient Physical Therapy  DAILY TREATMENT     [facility] Outpatient Physical Therapy  [address]  [place] [address]  Phone:  [phone]  Fax:  [phone]    Date: 02/26/2024    Patient: Herman Boyle  YOB: 1990  MRN: [id number]     Time Calculation    Start time: 0731              Chief Complaint: Shoulder Problem    Visit #: 3    SUBJECTIVE:  Shoulders aren't feeling too bad about, about 2/10 pain. Stretching seems to help.       OBJECTIVE:  Current objective measures:     Pre-treatment:  Right Shoulder   Flexion: 143 degrees   Abduction: 123 degrees    Left Shoulder   Flexion: 150 degrees   Abduction: 125 degrees    TTP infraspinatus R only  Hypomobile t/s throughout    Post-treatment:  Right Shoulder   Flexion: 150 degrees   Abduction: 137 degrees    Left Shoulder   Flexion: 150 degrees   Abdcution: 137 deg    Active Range of Motion -at eval:  Left Shoulder   Flexion: 141 degrees   Abduction: 101 degrees   External rotation 0°: 69 degrees   Internal rotation 0°: WFL     Right Shoulder   Flexion: 132 degrees   Abduction: 128 degrees   External rotation 0°: 65 degrees   Internal rotation 0°: WFL      Therapeutic Exercises (CPT 39294):     1. UBE, x3 min; alt every 1 min CW and CCW, cardiovascular warm up    2. FR: open book pec stretch, alt GH flexion, and t/s extensions, x15 ea    4. bird dogs, x10 ea, VC's to move shoulder to tolerable range only; about 45 deg    5. Qped shoulder circles, orange TB, 2x30 sec, hep    6. counter push ups, x15 3 sec hold, hep    7. shoulder extensions with dowel      Therapeutic Exercise Summary: Access Code: Access Code: [email or web address]  URL: [email or web address]  Date: 02/12/2024  Prepared by: Mike Malhotra    Exercises  - Correct Seated Posture  - 7 x weekly  - Shoulder External Rotation and Scapular Retraction with Resistance  - 2 x daily - 7 x weekly - 2-3 sets - 1-3 hold  - Shoulder Flexion Serratus  "Activation with Resistance  - 2 x daily - 7 x weekly - 2 sets - 15 reps  - Bird Dog  - 2 x daily - 7 x weekly - 2 sets - 10 reps - 5 sec hold  - Push-Up on Counter  - 3 x weekly - 1 sets - 15 reps - 3 sec hold  URL: [email or web address]  Date: 01/31/2024  Prepared by: Mike Malhotra    Exercises  - Correct Seated Posture  - 7 x weekly  - Shoulder External Rotation and Scapular Retraction with Resistance  - 2 x daily - 7 x weekly - 2-3 sets - 1-3 hold  - Shoulder Flexion Serratus Activation with Resistance  - 2 x daily - 7 x weekly - 2 sets - 15 reps    Pt performed these exercises with instruction and SPV.  Provided handout with these exercises for daily HEP.        Therapeutic Treatments and Modalities:     1. Manual Therapy (CPT 32950), see below, x15 min    Therapeutic Treatment and Modalities Summary: Manual:  STM to levator scap B, STM to infraspinatus B, B scapular mobs all directions Grade III-IV with pt in SL (pillow b/w knees for comfort) and pillow barrier between pt and therapist followed by B subscap release x10. CPA and rotational mobs gr III to T1-T10 in prone.        Time-based treatments/modalities:           Pain rating (1-10) before treatment:  0/10  Pain rating (1-10) after treatment: 6/10; \"sore\"  Pain Comments::  Aggravating: shoulder elevation and movement       ASSESSMENT:   Response to treatment:   Pt demonstrating improvements in ROM following manual to persicapular and shoulder girdle complex. Limitations in shoulder extension ROM, t/s mobility, and pain at R infraspinatus. Pt may continue to benefit from manual to these areas based on benefits found; this will be adjunct to continuation of periscapular strengthening program.      PLAN/RECOMMENDATIONS:   Plan for treatment: therapy treatment to continue next visit.  Planned interventions for next visit: continue with current treatment.         "

## 2024-02-28 ENCOUNTER — PHYSICAL THERAPY (OUTPATIENT)
Dept: PHYSICAL THERAPY | Facility: MEDICAL CENTER | Age: 34
End: 2024-02-28
Attending: NURSE PRACTITIONER
Payer: COMMERCIAL

## 2024-02-28 DIAGNOSIS — G89.29 CHRONIC RIGHT SHOULDER PAIN: ICD-10-CM

## 2024-02-28 DIAGNOSIS — M25.511 CHRONIC RIGHT SHOULDER PAIN: ICD-10-CM

## 2024-02-28 PROCEDURE — 97110 THERAPEUTIC EXERCISES: CPT

## 2024-02-28 PROCEDURE — 97012 MECHANICAL TRACTION THERAPY: CPT

## 2024-02-28 NOTE — OP THERAPY DAILY TREATMENT
Outpatient Physical Therapy  DAILY TREATMENT     [facility] Outpatient Physical Therapy  [address]  [place] [address]  Phone:  [phone]  Fax:  [phone]    Date: 02/28/2024    Patient: Herman Boyle  YOB: 1990  MRN: [id number]     Time Calculation    Start time: 0731  Stop time: 0817 Time Calculation (min): 46 minutes         Chief Complaint: Shoulder Problem    Visit #: 4    SUBJECTIVE:  Shoulders are feeling okay today. Overall about the same. Occasionally will have elbow pain.       OBJECTIVE:  Current objective measures:     Pre-treatment:  Right Shoulder   Flexion: 143 degrees   Abduction: 123 degrees    Left Shoulder   Flexion: 150 degrees   Abduction: 125 degrees    Special tests:  Median nerve tension tests: Pos for tension and superior shoulder pain      Therapeutic Exercises (CPT 68425):     1. pulleys, x2 min flexion, x min GH IR in standing, cardiovascular warm up    2. FR: open book pec stretch, alt GH flexion, and t/s extensions, x15 ea, NT    4. bird dogs, x10 ea, NT    5. Qped shoulder circles, orange TB, 2x30 sec, NT    6. counter push ups, x15 3 sec hold, NT    8. shoulder extensions at cage, x15    9. median nerve glides seated, x15, hep      Therapeutic Exercise Summary: Access Code: Access Code: [email or web address]  URL: [email or web address]  Date: 02/12/2024  Prepared by: Mike Malhotra    Exercises  - Correct Seated Posture  - 7 x weekly  - Shoulder External Rotation and Scapular Retraction with Resistance  - 2 x daily - 7 x weekly - 2-3 sets - 1-3 hold  - Shoulder Flexion Serratus Activation with Resistance  - 2 x daily - 7 x weekly - 2 sets - 15 reps  - Bird Dog  - 2 x daily - 7 x weekly - 2 sets - 10 reps - 5 sec hold  - Push-Up on Counter  - 3 x weekly - 1 sets - 15 reps - 3 sec hold  URL: [email or web address]  Date: 01/31/2024  Prepared by: Mike Malhotra    Exercises  - Correct Seated Posture  - 7 x weekly  -  "Shoulder External Rotation and Scapular Retraction with Resistance  - 2 x daily - 7 x weekly - 2-3 sets - 1-3 hold  - Shoulder Flexion Serratus Activation with Resistance  - 2 x daily - 7 x weekly - 2 sets - 15 reps    Pt performed these exercises with instruction and SPV.  Provided handout with these exercises for daily HEP.        Therapeutic Treatments and Modalities:     1. Mechanical Traction (CPT 27040), 35/15# mech c/s traction with mhp x 15 min    Therapeutic Treatment and Modalities Summary:         Time-based treatments/modalities:    Physical Therapy Timed Treatment Charges  Therapeutic exercise minutes (CPT 58436): 31 minutes      Pain rating (1-10) before treatment:  0/10  Pain rating (1-10) after treatment: 6/10; \"sore\"  Pain Comments::  Aggravating: shoulder elevation and movement       ASSESSMENT:   Response to treatment:   Pt reporting no significant changes at shoulder with PT rehab thus far. Does demo some neural tension at B shoulders with reproduction of pain complaints. Trial of [facility] c/s traction today to see if this will have an impact on shoulder pain complaints. Overall, shoulder ROM is inconsistent in sessions with no overall pattern of improvement or significant changes; will continue to monitor and further assess cervical spine if needed.      PLAN/RECOMMENDATIONS:   Plan for treatment: therapy treatment to continue next visit.  Planned interventions for next visit: continue with current treatment.  Assess response to c/s traction       "